# Patient Record
Sex: FEMALE | Race: WHITE
[De-identification: names, ages, dates, MRNs, and addresses within clinical notes are randomized per-mention and may not be internally consistent; named-entity substitution may affect disease eponyms.]

---

## 2019-02-20 ENCOUNTER — HOSPITAL ENCOUNTER (EMERGENCY)
Dept: HOSPITAL 97 - ER | Age: 44
Discharge: HOME | End: 2019-02-20
Payer: COMMERCIAL

## 2019-02-20 DIAGNOSIS — H81.399: Primary | ICD-10-CM

## 2019-02-20 DIAGNOSIS — J11.1: ICD-10-CM

## 2019-02-20 PROCEDURE — 96374 THER/PROPH/DIAG INJ IV PUSH: CPT

## 2019-02-20 PROCEDURE — 99283 EMERGENCY DEPT VISIT LOW MDM: CPT

## 2019-02-20 NOTE — XMS REPORT
Patient Summary Document

 Created on:2019



Patient:JAEL HILL

Sex:Female

:1975

External Reference #:[id number]





Demographics







 Address  [address] [place] [address]

 

 Home Phone  [phone]

 

 Work Phone  [phone] [employer]

 

 Preferred Language  Unknown

 

 Marital Status  Unknown

 

 Evangelical Affiliation  Unknown

 

 Race  Unknown

 

 Additional Race(s)  Unavailable

 

 Ethnic Group  Unknown









Author







 Organization  [facility]

 

 Address  [address] [place] [address] Dr. Martinez [address] [place] [address]

 

 Phone  [phone]









Care Team Providers







 Name  Role  Phone

 

 Unavailable  Unavailable  Unavailable









Problems

This patient has no known problems.



Allergies, Adverse Reactions, Alerts

This patient has no known allergies or adverse reactions.



Medications

This patient has no known medications.

## 2019-02-20 NOTE — EDPHYS
Physician Documentation                                                                           

 [facility]                                                                

Name: Aidee Henning                                                                                 

Age: 43 yrs                                                                                       

Sex: Female                                                                                       

: 1975                                                                                   

MRN: [id number]                                                                                   

Arrival Date: 2019                                                                          

Time: 08:20                                                                                       

Account#: [id number]                                                                            

Bed 16                                                                                            

Private MD: Jinny Marshall                                                                    

ED Physician Danny Montez                                                                      

HPI:                                                                                              

                                                                                             

09:19 This 43 yrs old  Female presents to ER via Ambulatory with complaints of       ps1 

      Dizziness.                                                                                  

09:19 patient has had flu like illness for last 5 days. HA, sinus congestion, cough           ps1 

      (productive), and now c/o dizziness. States that she has not been drinking 2/2 sore         

      throat. Has ear fullness. .                                                                 

                                                                                                  

OB/GYN:                                                                                           

08:45 LMP 2019                                                                            iw  

                                                                                                  

Historical:                                                                                       

- Allergies:                                                                                      

08:47 No Known Allergies;                                                                     iw  

- Home Meds:                                                                                      

08:47 None [Active];                                                                          iw  

- PMHx:                                                                                           

08:47 PUD;                                                                                    iw  

- PSHx:                                                                                           

08:47 Tubal ligation;                                                                         iw  

                                                                                                  

- Immunization history:: Adult Immunizations not up to date.                                      

- Social history:: Smoking status: Patient/guardian denies using tobacco.                         

- Ebola Screening: : Patient negative for fever greater than or equal to 101.5 degrees            

  Fahrenheit, and additional compatible Ebola Virus Disease symptoms Patient denies               

  exposure to infectious person Patient denies travel to an Ebola-affected area in the            

  21 days before illness onset No symptoms or risks identified at this time.                      

                                                                                                  

                                                                                                  

ROS:                                                                                              

09:19 Neck: Negative for injury, pain, and swelling, Cardiovascular: Negative for chest pain, ps1 

      palpitations, and edema, Abdomen/GI: Negative for abdominal pain, nausea, vomiting,         

      diarrhea, and constipation, Back: Negative for injury and pain, MS/Extremity: Negative      

      for injury and deformity, Skin: Negative for injury, rash, and discoloration.               

09:19 Constitutional: Positive for body aches, fatigue, fever, malaise, poor PO intake.           

09:19 ENT: Positive for sinus congestion.                                                         

                                                                                                  

Exam:                                                                                             

09:19 Constitutional:  This is a well developed, well nourished patient who is awake, alert,  ps1 

      and in no acute distress. Head/Face:  Normocephalic, atraumatic. Eyes:  Pupils equal        

      round and reactive to light, extra-ocular motions intact.  Lids and lashes normal.          

      Conjunctiva and sclera are non-icteric and not injected.                                    

09:19 Cardiovascular:  Regular rate and rhythm.  No gallops, murmurs, or rubs.  Normal PMI,       

      no JVD.  No pulse deficits. Respiratory:  Lungs have equal breath sounds bilaterally,       

      clear to auscultation and percussion.  No rales, rhonchi or wheezes noted.  No              

      increased work of breathing, no retractions or nasal flaring. Abdomen/GI:  Soft,            

      non-tender, with normal bowel sounds.  No distension or tympany.  No guarding or            

      rebound.  No evidence of tenderness throughout. MS/ Extremity:  Pulses equal, no            

      cyanosis.  Neurovascular intact.  Full, normal range of motion.                             

09:19 ENT: External ear(s): swelling, that is minimal, bilaterally.                               

09:19 Neuro: Orientation: is normal, Mentation: is normal, HINTS: positive for gaze evoked        

      nystagmus to left. No dysconjugate gaze, no catch up saccade. c/w peripheral vertigo.       

                                                                                                  

Vital Signs:                                                                                      

08:45  / 73; Pulse 90; Resp 16; Temp 97.9(TE); Pulse Ox 98% on R/A; Weight 70.31 kg;    iw  

      Height 5 ft. 6 in. (167.64 cm); Pain 0/10;                                                  

09:37  / 96; Pulse 80; Resp 14; Pulse Ox 100% ;                                         bp  

08:45 Body Mass Index 25.02 (70.31 kg, 167.64 cm)                                             iw  

                                                                                                  

MDM:                                                                                              

09:18 Patient medically screened.                                                             ps1 

                                                                                                  

Administered Medications:                                                                         

09:10 Drug: Meclizine 25 mg Route: PO;                                                        bp  

09:39 Follow up: Response: No adverse reaction; Marked relief of symptoms                     bp  

09:10 Drug: Decadron - Dexamethasone 10 mg {Note: GIVEN PO.} Route: IVP; Site: Other;         bp  

09:38 Follow up: Response: No adverse reaction; Marked relief of symptoms                     bp  

                                                                                                  

                                                                                                  

Disposition:                                                                                      

19 09:29 Discharged to Home. Impression: Other peripheral vertigo, left ear,                

  Influenza-like illness.                                                                         

- Condition is Stable.                                                                            

- Discharge Instructions: Dizziness, Influenza, Adult, Vertigo, Easy-to-Read.                     

- Prescriptions for Meclizine 25 mg Oral Tablet - take 1 tablet by ORAL route every 8             

  hours As needed; 30 tablet. Zofran 8 mg Oral Tablet - take 1 tablet by ORAL route               

  every 12 hours As needed; 20 tablet. chlorpheniramine maleate 4 mg Oral Tablet - take           

  1 tablet by ORAL route every 6 hours As needed; 30 tablet.                                      

- Medication Reconciliation Form, Thank You Letter, Antibiotic Education, Prescription            

  Opioid Use form.                                                                                

- Follow up: Jinny Marshall; When: As needed; Reason: Further diagnostic work-up,             

  Recheck today's complaints, Continuance of care, Re-evaluation by your physician.               

  Follow up: Emergency Department; When: As needed; Reason: Worsening of condition.               

                                                                                                  

                                                                                                  

                                                                                                  

Signatures:                                                                                       

Purnima Menendez RN                     RN   iw                                                   

Martin Parsons RN                      RN   bp                                                   

Danny Montez MD MD   ps1                                                  

                                                                                                  

Corrections: (The following items were deleted from the chart)                                    

09:39 09:29 2019 09:29 Discharged to Home. Impression: Other peripheral vertigo, left   bp  

      ear; Influenza-like illness. Condition is Stable. Forms are Medication Reconciliation       

      Form, Thank You Letter, Antibiotic Education, Prescription Opioid Use. Follow up:           

      Jinny Marshall; When: As needed; Reason: Further diagnostic work-up, Recheck today's     

      complaints, Continuance of care, Re-evaluation by your physician. Follow up: Emergency      

      Department; When: As needed; Reason: Worsening of condition. ps1                            

                                                                                                  

**************************************************************************************************

## 2020-11-15 ENCOUNTER — HOSPITAL ENCOUNTER (EMERGENCY)
Dept: HOSPITAL 97 - ER | Age: 45
Discharge: HOME | End: 2020-11-15
Payer: COMMERCIAL

## 2020-11-15 VITALS — TEMPERATURE: 98.2 F | DIASTOLIC BLOOD PRESSURE: 83 MMHG | SYSTOLIC BLOOD PRESSURE: 125 MMHG | OXYGEN SATURATION: 100 %

## 2020-11-15 DIAGNOSIS — F32.9: ICD-10-CM

## 2020-11-15 DIAGNOSIS — R21: Primary | ICD-10-CM

## 2020-11-15 DIAGNOSIS — K21.9: ICD-10-CM

## 2020-11-15 PROCEDURE — 96372 THER/PROPH/DIAG INJ SC/IM: CPT

## 2020-11-15 PROCEDURE — 99283 EMERGENCY DEPT VISIT LOW MDM: CPT

## 2020-11-15 NOTE — ER
Nurse's Notes                                                                                     

 [facility]                                                                 

Name: Aidee Henning                                                                                 

Age: 45 yrs                                                                                       

Sex: Female                                                                                       

: 1975                                                                                   

MRN: [id number]                                                                                   

Arrival Date: 11/15/2020                                                                          

Time: 10:30                                                                                       

Account#: [id number]                                                                            

Bed 17                                                                                            

Private MD:                                                                                       

Diagnosis: Rash and other nonspecific skin eruption                                               

                                                                                                  

Presentation:                                                                                     

11/15                                                                                             

10:40 Chief complaint: Patient states: Rash all over x 1 week, c/o itchiness. Went to PCP,    aa5 

      had steroid and allergy shot, it quit itching for a day, then last night it came back       

      and rashes broke out worse. Used Benadryl spray and cortisone cream, but only temporary     

      relief. Coronavirus screen: Client denies travel out of the [place] in the last 14 days.       

      At this time, the client does not indicate any symptoms associated with coronavirus-19.     

      Ebola Screen: Patient negative for fever greater than or equal to 101.5 degrees             

      Fahrenheit, and additional compatible Ebola Virus Disease symptoms Patient denies           

      exposure to infectious person. Patient denies travel to an Ebola-affected area in the       

      21 days before illness onset. No symptoms or risks identified at this time. Initial         

      Sepsis Screen: Does the patient meet any 2 criteria? No. Patient's initial sepsis           

      screen is negative. Does the patient have a suspected source of infection? No.              

      Patient's initial sepsis screen is negative. Risk Assessment: Do you want to hurt           

      yourself or someone else? Patient reports no desire to harm self or others. Onset of        

      symptoms was November 15, 2020.                                                             

10:40 Method Of Arrival: Ambulatory                                                           aa5 

10:40 Acuity: BALTAZAR 4                                                                           aa5 

                                                                                                  

OB/GYN:                                                                                           

10:44 LMP N/A - Irregular menses                                                              aa5 

                                                                                                  

Historical:                                                                                       

- Allergies:                                                                                      

10:44 No Known Allergies;                                                                     aa5 

- Home Meds:                                                                                      

10:44 Zoloft Oral [Active]; pantoprazole oral oral [Active];                                  aa5 

- PMHx:                                                                                           

10:44 PUD; GERD; Depression;                                                                  aa5 

- PSHx:                                                                                           

10:44 Tubal ligation;                                                                         aa5 

                                                                                                  

- Immunization history:: Adult Immunizations up to date, Flu vaccine is up to date.               

- Social history:: Smoking status: Patient denies any tobacco usage or history of.                

- Family history:: not pertinent.                                                                 

- Hospitalizations: : No recent hospitalization is reported.                                      

                                                                                                  

                                                                                                  

Screenin:00 Abuse screen: Denies threats or abuse. Denies injuries from another. Nutritional        sv  

      screening: No deficits noted. Tuberculosis screening: No symptoms or risk factors           

      identified. Fall Risk None identified.                                                      

                                                                                                  

Assessment:                                                                                       

11:00 General: Appears in no apparent distress. comfortable, well groomed, well developed,    sv  

      Behavior is calm, cooperative, appropriate for age. Pain: Denies pain. Neuro: Level of      

      Consciousness is awake, alert, obeys commands, Oriented to person, place, time,             

      situation, Moves all extremities. Full function Gait is steady. Respiratory: Airway is      

      patent Respiratory effort is even, unlabored, Respiratory pattern is regular,               

      symmetrical. Derm: Skin is pink, warm \T\ dry. Rash noted that is itchy, red, urticaria,    

      on chest, abdomen, right arm, left arm, right leg and left leg. Musculoskeletal: Range      

      of motion: intact in all extremities.                                                       

11:09 Reassessment: Socks given to the pt per her request.                                    sv  

11:45 Reassessment: Pt waiting IM shot time before discharge.                                 sv  

12:05 Reassessment: Patient appears in no apparent distress at this time. No changes from     sv  

      previously documented assessment. Patient and/or family updated on plan of care and         

      expected duration. Pain level reassessed. Patient is alert, oriented x 3, equal             

      unlabored respirations, skin warm/dry/pink.                                                 

                                                                                                  

Vital Signs:                                                                                      

10:40  / 83; Pulse 80; Resp 16 S; Temp 98.2(O); Pulse Ox 100% on R/A; Weight 74.84 kg   aa5 

      (R); Height 5 ft. 6 in. (167.64 cm) (R);                                                    

10:40 Body Mass Index 26.63 (74.84 kg, 167.64 cm)                                             aa5 

                                                                                                  

ED Course:                                                                                        

10:30 Patient arrived in ED.                                                                  ag5 

10:43 Triage completed.                                                                       aa5 

10:44 Arm band placed on right wrist.                                                         aa5 

11:00 Patient has correct armband on for positive identification. Placed in gown. Bed in low  sv  

      position. Call light in reach. Door closed. Warm blanket given. Head of bed elevated.       

11:05 Chetan Rodrigues MD is Attending Physician.                                                rn  

11:09 Jinny Danielson RN is Primary Nurse.                                                  sv  

11:09 ED physician to see patient.                                                            sv  

12:05 No provider procedures requiring assistance completed. Patient did not have IV access   sv  

      during this emergency room visit.                                                           

                                                                                                  

Administered Medications:                                                                         

11:44 Drug: SOLU-Medrol 125 mg Route: IM; Site: left gluteus;                                 sv  

12:04 Follow up: Response: No adverse reaction                                                sv  

                                                                                                  

                                                                                                  

Outcome:                                                                                          

: Discharge ordered by MD.                                                                rn  

12:05 Discharged to home ambulatory.                                                          sv  

12:05 Condition: stable                                                                           

12:05 Discharge instructions given to patient, Instructed on discharge instructions, follow       

      up and referral plans. medication usage, Demonstrated understanding of instructions,        

      follow-up care, medications, Prescriptions given X 4.                                       

12:05 Patient left the ED.                                                                    sv  

                                                                                                  

Signatures:                                                                                       

Jinny Danielson, RN                    RN   Chetan Sebastian MD MD rn Calderon, Audri, RN                     RN   aa5                                                  

Luis Gr                                                  

                                                                                                  

**************************************************************************************************

## 2020-11-15 NOTE — XMS REPORT
Summarization of Episode Note

                           Created on:November 3, 2020



Patient:Aidee Henning

Sex:Female

:1975

External Reference #:[id number]





Demographics







                          Address                   [address]



                                                    [place] [address]

 

                          Home Phone                [phone]

 

                          Mobile Phone              [phone]

 

                          Email Address             [email or web address]

 

                          Preferred Language        en

 

                          Marital Status            Unknown

 

                          Zoroastrian Affiliation     Unknown

 

                          Race                      White

 

                          Ethnic Group              Not  or 









Author







                          Organization              [facility]

 

                          Address                   [address] 



                                                    [place] [address]

 

                          Phone                     [phone]









Support







                Name            Relationship    Address         Phone

 

                Milady          Unavailable     [id number]     [phone]



                                                [place] [address] 

 

                Milady          Unavailable     [address]     [phone]



                                                [place] [address] 









Care Team Providers







                    Name                Role                Phone

 

                    Mitul Johnson         [phone]









PROBLEMS







        Type    Condition ICD9-CM QLI66-UH Onset   Condition SNOMED Code Notes



                        Code    Code    Dates   Status          

 

        Problem Acute           F32.9           Active  697359564 



                depression                                         

 

        Problem History of         Z98.82          Active          



                breast implant                                         

 

        Problem Migraine         G43.909         Active  16348322 



                without status                                         



                migrainosus,                                         



                not                                             



                intractable,                                         



                unspecified                                         



                migraine type                                         

 

        Problem GERD without         K21.9           Active  014693609 



                esophagitis                                         

 

        Problem Nausea alone         R11.0           Active  257450660 







ALLERGIES

No Known Allergies



ENCOUNTERS from 1975 to 2020







             Encounter    Location     Date         Provider     Diagnosis

 

             [facility] [address] [facility] [address] 27 Oct, 2020 Brad Bauman     

Gastroenteritis 

K52.9



             Family [place], [place]                           



                          [address]                             







IMMUNIZATIONS

No Information



SOCIAL HISTORY

Tobacco Use:





                    Social History Observation Description         Date

 

                    Details (start date - stop date) Former Smoker       



Sex Assigned At Birth:





                          Social History Observation Description

 

                          Sex Assigned At Birth     Unknown



PHQ9





                    Question            Answer              Notes

 

                    Little interest or pleasure in doing things Several days    

    

 

                    Feeling down, depressed, or hopeless Nearly every day    

 

                    Trouble falling or staying asleep or sleeping too much Not a

t all          

 

                    Feeling tired or having little energy More than half the day

s 

 

                    Poor appetite or overeating Nearly every day    

 

                    Feeling bad about yourself, or that you are a failure, More 

than half the days 



                    or have let yourself or your family down                    

 

 

                    Trouble concentrating on things, such as reading the Several

 days        



                    newspaper or watching television                     

 

                    Moving or speaking so slowly that other people could Not at 

all          



                    have noticed; or the opposite, being so fidgety or          

           



                    restless that you have been moving around a lot more        

             



                    than usual                              

 

                    Total Score         12                  

 

                    Interpretation      Moderate Depression 

 

                    Thoughts that you would be better off dead or of Not at all 

         



                    hurting yourself in some way                     



Alcohol Screen





                    Question            Answer              Notes

 

                    Did you have a drink containing alcohol in the past Yes     

            



                    year?                                   

 

                    Points              1                   

 

                    Interpretation      Negative            

 

                    How often did you have a drink containing alcohol in Monthly

 or less (1 point) 



                    the past year?                          



Tobacco Use/Smoking





                    Question            Answer              Notes

 

                    Are you a           former smoker       







REASON FOR REFERRAL

No Information



VITAL SIGNS

No information



MEDICATIONS







             Medication   SIG (Take, Route, Frequency, Start Date   End Date    

 Status



                          Duration)                              

 

             Sertraline HCl 25 MG 1 tablet Orally Once a day for                

           Active



                          30                                     

 

             Ubrelvy 100 MG 1 tablet may take second dose 16 Oct, 2020 15  Active



                          at least 2 hours after first                          

 



                          dose as needed Orally Once a                          

 



                          day for 30 day(s)                           

 

             Dicyclomine HCl 20 MG 1 tablet Orally Four times a 27 Oct, 2020 5 2020  

Active



                          day as needed for cramping for                        

   



                          10 days                                

 

             Lansoprazole 30 MG take 1 capsule by mouth once                    

       Active



                          daily for 30 days Oral Once a                         

  



                          day for 90 days                           







PROCEDURES

No Information



RESULTS

No Results



REASON FOR VISIT

Sick/Door pt



MEDICAL (GENERAL) HISTORY







                    Type                Description         Date

 

                    Medical History     GERD without esophagitis 

 

                    Medical History     Migraine without status migrainosus, not

 intractable, 



                                        unspecified migraine type 

 

                    Medical History     Fibrocystic  Breast 

 

                    Medical History     Well woman exam with routine gynecologic

al exam 

 

                    Medical History     Acute vaginitis     

 

                    Surgical History    Breast  Implants  Saline  type. 1985







Goals Section

No Information



Health Concerns

No Information



MEDICAL EQUIPMENT

No Information



MENTAL STATUS

No Information



FUNCTIONAL STATUS

No Information



ASSESSMENTS







                    Encounter Date      Diagnosis           Notes

 

                    27 Oct, 2020        Gastroenteritis (ICD-10 - K52.9) 







PLAN OF TREATMENT

Medication





                Medication Name Sig             Start Date      Stop Date

 

                Sertraline HCl 25 MG 1 tablet Orally Once a day for 30          

       

 

                Dicyclomine HCl 20 MG 1 tablet Orally Four times a day as 27 Oct

, 2020    5 2020



                                needed for cramping for 10 days                 



Treatment Notes





                    Assessment          Notes               Clinical Notes

 

                    Gastroenteritis     44 F presents with abd cramping and wate

ry diarrhea x 



                                        4 days without f/c, tollerating fluids s

olids.-will 



                                        start dicyclomine-continue oral fluids a

nd solids-RTC 



                                        precautions discussed

## 2020-11-15 NOTE — XMS REPORT
Summarization of Episode Note

                           Created on:2020



Patient:Aidee Henning

Sex:Female

:1975

External Reference #:[id number]





Demographics







                          Address                   [address] 



                                                    [place] [address]

 

                          Home Phone                [phone]

 

                          Mobile Phone              [phone]

 

                          Email Address             [email or web address]

 

                          Preferred Language        en

 

                          Marital Status            Unknown

 

                          Sikh Affiliation     Unknown

 

                          Race                      White

 

                          Ethnic Group              Not  or 









Author







                          Organization              [facility]

 

                          Address                   [address] [facility]



                                                    [place] [address]

 

                          Phone                     [phone]









Support







                Name            Relationship    Address         Phone

 

                Milady          Unavailable     [address]     [phone]



                                                [place] [address] 

 

                Milady          Unavailable     [address]  [address]    [phone]



                                                [place] [address] 









Care Team Providers







                    Name                Role                Phone

 

                    Biju Johnson         [phone]









PROBLEMS







        Type    Condition ICD9-CM NNU95-EI Onset   Condition SNOMED Code Notes



                        Code    Code    Dates   Status          

 

        Problem Acute           F32.9           Active  231500957 



                depression                                         

 

        Problem History of         Z98.82          Active          



                breast implant                                         

 

        Problem Migraine         G43.909         Active  35913640 



                without status                                         



                migrainosus,                                         



                not                                             



                intractable,                                         



                unspecified                                         



                migraine type                                         

 

        Problem GERD without         K21.9           Active  444301479 



                esophagitis                                         

 

        Problem Nausea alone         R11.0           Active  155300500 







ALLERGIES

No Known Allergies



ENCOUNTERS from 1974 to 2020-10-08







             Encounter    Location     Date         Provider     Diagnosis

 

             [facility] [address] [facility] 08 Oct, 2020 Priya Ivy  Migr

palomo without



             Family Medicine [address],                           status 

migrainosus,



                          [place] [address]                           not intractabl

e,



                                                                 unspecified lesly

michelle



                                                                 type G43.909 an

d



                                                                 Nausea alone R1

1.0







IMMUNIZATIONS

No Information



SOCIAL HISTORY

Tobacco Use:





                    Social History Observation Description         Date

 

                    Details (start date - stop date) Former Smoker       



Sex Assigned At Birth:





                          Social History Observation Description

 

                          Sex Assigned At Birth     Unknown



PHQ9





                    Question            Answer              Notes

 

                    Little interest or pleasure in doing things Several days    

    

 

                    Feeling down, depressed, or hopeless Nearly every day    

 

                    Trouble falling or staying asleep or sleeping too much Not a

t all          

 

                    Feeling tired or having little energy More than half the day

s 

 

                    Poor appetite or overeating Nearly every day    

 

                    Feeling bad about yourself, or that you are a failure, More 

than half the days 



                    or have let yourself or your family down                    

 

 

                    Trouble concentrating on things, such as reading the Several

 days        



                    newspaper or watching television                     

 

                    Moving or speaking so slowly that other people could Not at 

all          



                    have noticed; or the opposite, being so fidgety or          

           



                    restless that you have been moving around a lot more        

             



                    than usual                              

 

                    Total Score         12                  

 

                    Interpretation      Moderate Depression 

 

                    Thoughts that you would be better off dead or of Not at all 

         



                    hurting yourself in some way                     



Alcohol Screen





                    Question            Answer              Notes

 

                    Did you have a drink containing alcohol in the past Yes     

            



                    year?                                   

 

                    Points              1                   

 

                    Interpretation      Negative            

 

                    How often did you have a drink containing alcohol in Monthly

 or less (1 point) 



                    the past year?                          



Tobacco Use/Smoking





                    Question            Answer              Notes

 

                    Are you a           former smoker       







REASON FOR REFERRAL

No Information



VITAL SIGNS







                    Height              65 in               08 Oct, 2020

 

                    Weight              165.4 lbs           08 Oct, 2020

 

                    Temperature         97.4 degrees Fahrenheit 08 Oct, 2020

 

                    BMI                 27.52 kg/m2         08 Oct, 2020

 

                    Oximetry            97 %                08 Oct, 2020

 

                    Respiratory Rate    16 /min             08 Oct, 2020

 

                    Blood pressure systolic 128 mm Hg           08 Oct, 2020

 

                    Blood pressure diastolic 64 mm Hg            08 Oct, 2020







MEDICATIONS







             Medication   SIG (Take, Route, Frequency, Start Date   End Date    

 Status



                          Duration)                              

 

             Lansoprazole 30 MG take 1 capsule by mouth once                    

       Active



                          daily for 30 days Oral Once a                         

  



                          day for 90 days                           

 

             Sertraline HCl 25 MG 1 tablet Orally Once a day 28 Sep, 2020       

       Active



                          for 30 day(s)                           

 

             Promethazine HCl 25 mg 1 tablet as needed Orally              2020 Active



                          every 12 hrs for 30 days                           







PROCEDURES

No Information



RESULTS

No Results



REASON FOR VISIT

migranes; starting to have more often, forgot to mention at last visit 
[phone]



MEDICAL (GENERAL) HISTORY







                    Type                Description         Date

 

                    Medical History     GERD without esophagitis 

 

                    Medical History     Migraine without status migrainosus, not

 intractable, 



                                        unspecified migraine type 

 

                    Medical History     Fibrocystic  Breast 

 

                    Medical History     Well woman exam with routine gynecologic

al exam 

 

                    Medical History     Acute vaginitis     

 

                    Surgical History    Breast  Implants  Saline  type. 1985







Goals Section

No Information



Health Concerns

No Information



MEDICAL EQUIPMENT

No Information



MENTAL STATUS

No Information



FUNCTIONAL STATUS

No Information



ASSESSMENTS







                    Encounter Date      Diagnosis           Notes

 

                    08 Oct, 2020        Nausea alone (ICD-10 - R11.0) 

 

                    08 Oct, 2020        Migraine without status migrainosus, not

 intractable, 



                                        unspecified migraine type (ICD-10 - G43.

909) 







PLAN OF TREATMENT

Treatment Notes





                    Assessment          Notes               Clinical Notes

 

                    Migraine without status migrainosus, Samples:Ubrelvy starter

 pack Lot# 



                    not intractable, unspecified [id number] X 1Ubrelvy 50 mg tab Lo

t# 



                    migraine type       [id number] exp 11/2022 X 1 boxUbrelvy 



                                        100mg Tab Lot# [id number] exp 1/2022 



                                        X 1 boxTake med at the beginning 



                                        of the HA.          

 

                    Nausea alone        clear liquids       



Next Appt





                                        Details

 

                                        keep appt in 3 weeks Reason:

 

                                        Provider Name:Priya Ivy, 2020-10-19 04

:00:00 PM, [address] [facility], [address], [place], [address], [phone]







Insurance Providers







          Payer Name Payer     Payer     Insured   Patient   Coverage  Coverage 

End



                    Address   Phone     Name      Relationship to Start Date [place]

e



                                                  Insured             

 

          [employer] from [address]    [phone] Aidee Henning self                



          [place]  [id number]    [phone]       [facility]                                         



                    [id number]

## 2020-11-15 NOTE — XMS REPORT
Summarization of Episode Note

                           Created on:2020



Patient:Aidee Henning

Sex:Female

:1975

External Reference #:[id number]





Demographics







                          Address                   [address]



                                                    [place] [address]

 

                          Home Phone                [phone]

 

                          Mobile Phone              [phone]

 

                          Email Address             [email or web address]

 

                          Preferred Language        en

 

                          Marital Status            Unknown

 

                          Mosque Affiliation     Unknown

 

                          Race                      White

 

                          Ethnic Group              Not  or 









Author







                          Organization              [facility]

 

                          Address                   [address]



                                                    [place] [address]

 

                          Phone                     [phone]









Support







                Name            Relationship    Address         Phone

 

                Milady          Unavailable     [address]  [address]    [phone]



                                                [place] [address] 

 

                Milady          Unavailable     [id number]     [phone]



                                                [place] [address] 









Care Team Providers







                    Name                Role                Phone

 

                    Biju Johnson         [phone]









PROBLEMS







        Type    Condition ICD9-CM FMB00-AZ Onset   Condition SNOMED Code Notes



                        Code    Code    Dates   Status          

 

        Problem Acute           F32.9           Active  260883235 



                depression                                         

 

        Problem History of         Z98.82          Active          



                breast implant                                         

 

        Problem Migraine         G43.909         Active  90256152 



                without status                                         



                migrainosus,                                         



                not                                             



                intractable,                                         



                unspecified                                         



                migraine type                                         

 

        Problem GERD without         K21.9           Active  910664911 



                esophagitis                                         

 

        Problem Nausea alone         R11.0           Active  121885413 







ALLERGIES

No Known Allergies



ENCOUNTERS from 1975 to 2020-10-16







             Encounter    Location     Date         Provider     Diagnosis

 

             [facility] [address] [address] Oct, 2020 Priya vincent  



             [place] [address]                           







IMMUNIZATIONS

No Information



SOCIAL HISTORY

Tobacco Use:





                    Social History Observation Description         Date

 

                    Details (start date - stop date) Former Smoker       



Sex Assigned At Birth:





                          Social History Observation Description

 

                          Sex Assigned At Birth     Unknown



PHQ9





                    Question            Answer              Notes

 

                    Little interest or pleasure in doing things Several days    

    

 

                    Feeling down, depressed, or hopeless Nearly every day    

 

                    Trouble falling or staying asleep or sleeping too much Not a

t all          

 

                    Feeling tired or having little energy More than half the day

s 

 

                    Poor appetite or overeating Nearly every day    

 

                    Feeling bad about yourself, or that you are a failure, More 

than half the days 



                    or have let yourself or your family down                    

 

 

                    Trouble concentrating on things, such as reading the Several

 days        



                    newspaper or watching television                     

 

                    Moving or speaking so slowly that other people could Not at 

all          



                    have noticed; or the opposite, being so fidgety or          

           



                    restless that you have been moving around a lot more        

             



                    than usual                              

 

                    Total Score         12                  

 

                    Interpretation      Moderate Depression 

 

                    Thoughts that you would be better off dead or of Not at all 

         



                    hurting yourself in some way                     



Alcohol Screen





                    Question            Answer              Notes

 

                    Did you have a drink containing alcohol in the past Yes     

            



                    year?                                   

 

                    Points              1                   

 

                    Interpretation      Negative            

 

                    How often did you have a drink containing alcohol in Monthly

 or less (1 point) 



                    the past year?                          



Tobacco Use/Smoking





                    Question            Answer              Notes

 

                    Are you a           former smoker       







REASON FOR REFERRAL

No Information



VITAL SIGNS

No information



MEDICATIONS







             Medication   SIG (Take, Route, Frequency, Start Date   End Date    

 Status



                          Duration)                              

 

             Lansoprazole 30 MG take 1 capsule by mouth once                    

       Active



                          daily for 30 days Oral Once a                         

  



                          day for 90 days                           

 

             Sertraline HCl 25 MG 1 tablet Orally Once a day 28 Sep, 2020       

       Active



                          for 30 day(s)                           

 

             Promethazine HCl 25 mg 1 tablet as needed Orally              2020 Active



                          every 12 hrs for 30 days                           

 

             Ubrelvy 100 MG 1 tablet may take second dose 16 Oct, 2020 15 Nov, 

020 Active



                          at least 2 hours after first                          

 



                          dose as needed Orally Once a                          

 



                          day for 30 day(s)                           







PROCEDURES

No Information



RESULTS

No Results



REASON FOR VISIT

Ubrelvy



MEDICAL (GENERAL) HISTORY







                    Type                Description         Date

 

                    Medical History     GERD without esophagitis 

 

                    Medical History     Migraine without status migrainosus, not

 intractable, 



                                        unspecified migraine type 

 

                    Medical History     Fibrocystic  Breast 

 

                    Medical History     Well woman exam with routine gynecologic

al exam 

 

                    Medical History     Acute vaginitis     

 

                    Surgical History    Breast  Implants  Saline  type. 1985







Goals Section

No Information



Health Concerns

No Information



MEDICAL EQUIPMENT

No Information



MENTAL STATUS

No Information



FUNCTIONAL STATUS

No Information



ASSESSMENTS

No Information



PLAN OF TREATMENT

Medication





                Medication Name Sig             Start Date      Stop Date

 

                Ubrelvy 100 MG  1 tablet may take second dose at least 2 16 Oct,

 2020    15 Nov, 

2020



                                hours after first dose as needed Orally         

        



                                Once a day for 30 day(s)                 



Next Appt





                                        Details

 

                                        Provider Name:Priya Ivy, 2020-10-28 02

:20:00 PM, [address], [address], [place], [address], [phone]







Insurance Providers







          Payer Name Payer     Payer     Insured   Patient   Coverage  Coverage 

End



                    Address   Phone     Name      Relationship to Start Date Angelito

e



                                                  Insured             

 

          [employer] from [address]    [phone] Aidee Henning self                



          [place]  [phone]       [facility]                                         



                    [address]

## 2020-11-15 NOTE — XMS REPORT
Continuity of Care Document

                          Created on:November 15, 2020



Patient:JAEL HILL

Sex:Female

:1975

External Reference #:[id number]





Demographics







                          Address                   [address] [place] [address] [address]



                                                    [place] [id number]

 

                          Home Phone                [phone]

 

                          Work Phone                [phone] [facility]

 

                          Mobile Phone              [phone]

 

                          Email Address             [email or web address]

 

                          Preferred Language        en

 

                          Marital Status            Unknown

 

                          Tenriism Affiliation     Unknown

 

                          Race                      Unknown

 

                          Additional Race(s)        White



                                                    Unavailable

 

                          Ethnic Group              Not  or 









Author







                          Organization              [facility]

t

 

                          Address                   [address]



                                                    [place] [address]

 

                          Phone                     [phone]









Support







                Name            Relationship    Address         Phone

 

                Milady          Unavailable     [address] [address]    [phone]



                                                [place] [id number] 

 

                Milady          Unavailable     [address] [address]    [phone]



                                                [place] [address] 









Care Team Providers







                    Name                Role                Phone

 

                    Unavailable         Unavailable         Unavailable









Problems

This patient has no known problems.



Allergies, Adverse Reactions, Alerts

This patient has no known allergies or adverse reactions.



Medications







       Ordered Filled Start  Stop   Current Ordering Indication Dosage Frequency

 Signature

                    Comments            Components          Source



     Medication Medication Date Date Medication? Clinician                (SIG) 

          



     Name Name                                                   

 

     Lexapro Lexapro       Yes  Valerie                1 tablet           C

[facility]



                          [facility]                               [facility]



               [facility]                                              [facility]

 

     Prevacid Prevacid           Yes  Valerie                1 capsule           

[facility]



                              [facility]                               [facility]



                                                                 [facility]

 

     Promethazin Promethazin           Yes  Valerie                1 tablet      

     [facility]



     e HCl e HCl                Meehan                as needed           [facility]



                                                  [facility]  N[facility]V           [facility]







Procedures

This patient has no known procedures.



Encounters







        Start   End     Encounter Admission Attending Care    Care    Encounter 

Source



        Date/Time Date/Time Type    Type    Clinicians Facility Department ID   

   

 

        2020-11-10 2020-11-10 Outpatient                 [facility]  [id number]

 [facility]



        [facility]00                                                 [facility]



                                                                        [facility]



                                                                        l



                                                                        Outpati



                                                                        ent



                                                                        Clinics

 

        2020-10-27 2020-10-27 Outpatient                 [facility]  [id number]

 [facility]



        [facility]                                                 [facility]

 

        2020-10-27 2020-10-27 Outpatient                 [facility]  [id number]

 [facility]



        [facility]00                                                 [facility]



                                                                        [facility]



                                                                        l



                                                                        Outpati



                                                                        ent



                                                                        Clinics

 

        2020[facility]10[facility]16 2020[facility]16 [facility]  [id number]



        [facility]00                                                 [facility]



                                                                        [facility]



                                                                        l



                                                                        Outpati



                                                                        ent



                                                                        Clinics

 

        2020-10-08 2020[facility]-08 [facility]  [id number]

 [facility]



        [facility]00                                                 [facility]



                                                                        [facility]



                                                                        l



                                                                        Outpati



                                                                        ent



                                                                        Clinics

 

        2020 [facility]  [id number]

 [facility]



        [facility]00                                                 [facility]



                                                                        [facility]



                                                                        l



                                                                        Outpati



                                                                        ent



                                                                        Clinics

 

        2020-05-15 2020-05-15 Outpatient                 [facility]

[id number] [facility]



        14:46:00 14:46:00                         [facility]  Medicine         l



                                                Medicine                 Outpati



                                                                        ent



                                                                        Clinics

 

        2020 Outpatient                 [facility]

[id number] [facility]



        13:43:00 13:43:00                         [facility]  Medicine         l



                                                Medicine                 Outpati



                                                                        ent



                                                                        Clinics

 

        2020 Outpatient                 [facility]

[id number] [facility]



        16:38:00 16:38:00                         [facility]  Medicine         l



                                                Medicine                 Outpati



                                                                        ent



                                                                        Clinics

 

        2020 Outpatient                 [facility]

[id number] [facility]



        09:17:00 09:17:00                         [facility]  Medicine         l



                                                Medicine                 Outpati



                                                                        ent



                                                                        Clinics

 

        2020 Outpatient                 [facility]

[id number] [facility]



        08:00:00 08:00:00                         [facility]  Medicine         l



                                                Medicine                 Outpati



                                                                        ent



                                                                        Clinics

 

        2020 Outpatient                 [facility]

[id number] [facility]



        10:25:00 10:25:00                         [facility]  Medicine         l



                                                Medicine                 Outpati



                                                                        ent



                                                                        Clinics

 

        2020 Outpatient                 [facility]

[id number] [facility]



        08:27:00 08:27:00                         [facility]  Medicine         l



                                                Medicine                 Outpati



                                                                        ent



                                                                        Clinics

 

        2020 Outpatient                 [facility]

[id number] [facility]



        09:40:00 09:40:00                         [facility]  Medicine         l



                                                Medicine                 Outpati



                                                                        ent



                                                                        Clinics

 

        2020 Outpatient                 [facility]

[id number] [facility]



        15:20:00 15:20:00                         [facility]



                                                Medicine                 Outpati



                                                                        ent



                                                                        Clinics

 

        2020 Outpatient                 [facility] [address]

[id number] [facility]



        14:03:00 14:03:00                         [facility]  Medicine         l



                                                Medicine                 Outpati



                                                                        ent



                                                                        Clinics

 

        2020 Outpatient                 [facility] [address]

[id number] [facility]



        09:00:00 09:00:00                         [facility]



                                                [facility]



                                                                        [facility]



                                                                        [facility]







Results

This patient has no known results.

## 2020-11-15 NOTE — EDPHYS
Physician Documentation                                                                           

 [facility]                                                                 

Name: Aidee Henning                                                                                 

Age: 45 yrs                                                                                       

Sex: Female                                                                                       

: 1975                                                                                   

MRN: [id number]                                                                                   

Arrival Date: 11/15/2020                                                                          

Time: 10:30                                                                                       

Account#: [id number]                                                                            

Bed 17                                                                                            

Private MD:                                                                                       

ED Physician Chetan Rodrigues                                                                         

HPI:                                                                                              

11/15                                                                                             

11:17 This 45 yrs old  Female presents to ER via Ambulatory with complaints of Rash. rn  

11:17 The patient's rash thought to be caused by an unknown cause. The rash is located on the rn  

      body diffusely. The rash can be described as diffuse, erythematous, papular.                

11:18 Onset: The symptoms/episode began/occurred 1 week(s) ago. Associated signs and          rn  

      symptoms: Pertinent negatives: difficulty breathing, fever, swelling of lips, swelling      

      of throat, swelling of tongue. Severity of symptoms: At their worst the symptoms were       

      mild in the emergency department the symptoms are unchanged. The patient has not            

      experienced similar symptoms in the past. The patient has been recently seen by a           

      physician:. Reports diffuse itching and rash to body, started on right arm and spread       

      to torso. No fever. Works in healthcare. No drainage. Given steroid injection by pcp        

      and improved itching, rash itself did not improve. No other person in household showing     

      similar symptoms. .                                                                         

                                                                                                  

OB/GYN:                                                                                           

10:44 LMP N/A - Irregular menses                                                              aa5 

                                                                                                  

Historical:                                                                                       

- Allergies:                                                                                      

10:44 No Known Allergies;                                                                     aa5 

- Home Meds:                                                                                      

10:44 Zoloft Oral [Active]; pantoprazole oral oral [Active];                                  aa5 

- PMHx:                                                                                           

10:44 PUD; GERD; Depression;                                                                  aa5 

- PSHx:                                                                                           

10:44 Tubal ligation;                                                                         aa5 

                                                                                                  

- Immunization history:: Adult Immunizations up to date, Flu vaccine is up to date.               

- Social history:: Smoking status: Patient denies any tobacco usage or history of.                

- Family history:: not pertinent.                                                                 

- Hospitalizations: : No recent hospitalization is reported.                                      

                                                                                                  

                                                                                                  

ROS:                                                                                              

11:18 Constitutional: Negative for fever, chills, and weight loss, Eyes: Negative for injury, rn  

      pain, redness, and discharge, ENT: Negative for injury, pain, and discharge, Neck:          

      Negative for injury, pain, and swelling, Cardiovascular: Negative for chest pain,           

      palpitations, and edema, Respiratory: Negative for shortness of breath, cough,              

      wheezing, and pleuritic chest pain, Abdomen/GI: Negative for abdominal pain, nausea,        

      vomiting, diarrhea, and constipation, MS/Extremity: Negative for injury and deformity,      

      Skin: + rash diffusely Neuro: Negative for headache, weakness, numbness, tingling, and      

      seizure.                                                                                    

                                                                                                  

Exam:                                                                                             

11:18 Constitutional:  This is a well developed, well nourished patient who is awake, alert,  rn  

      and in no acute distress. Head/Face:  Normocephalic, atraumatic. Respiratory:  No           

      wheezing or respiratory distress Skin:  warm, dry, + diffuse erythematous papular rash      

      over extremities/torso/neck. No fluctuance. + numerous excoriations.                        

                                                                                                  

Vital Signs:                                                                                      

10:40  / 83; Pulse 80; Resp 16 S; Temp 98.2(O); Pulse Ox 100% on R/A; Weight 74.84 kg   aa5 

      (R); Height 5 ft. 6 in. (167.64 cm) (R);                                                    

10:40 Body Mass Index 26.63 (74.84 kg, 167.64 cm)                                             aa5 

                                                                                                  

MDM:                                                                                              

11:05 Patient medically screened.                                                             rn  

11:18 Differential diagnosis: allergic reaction, parasite infection, folliculitis. Data       rn  

      reviewed: vital signs, nurses notes, and as a result, I will discharge patient.             

      Counseling: I had a detailed discussion with the patient and/or guardian regarding: the     

      historical points, exam findings, and any diagnostic results supporting the                 

      discharge/admit diagnosis, the need for outpatient follow up, to return to the              

      emergency department if symptoms worsen or persist or if there are any questions or         

      concerns that arise at home. Special discussion: I discussed with the patient/guardian      

      in detail that at this point there is no indication for admission to the hospital. It       

      is understood, however, that if the symptoms persist or worsen the patient needs to         

      return immediately for re-evaluation. Based on the history and exam findings, there is      

      no indication for further emergent testing or inpatient evaluation. I discussed with        

      the patient/guardian the need to see the dermatologist for further evaluation of the        

      symptoms. ED course: Possible scabies/dermatitis/folliculitis. Unable to test here,         

      will dc home with steroids/abx/permethrin, and recommend dermatology f/u if worsens. .      

                                                                                                  

Administered Medications:                                                                         

11:44 Drug: SOLU-Medrol 125 mg Route: IM; Site: left gluteus;                                 sv  

12:04 Follow up: Response: No adverse reaction                                                sv  

                                                                                                  

                                                                                                  

Disposition:                                                                                      

11/15/20 11:22 Discharged to Home. Impression: Rash and other nonspecific skin eruption.          

- Condition is Stable.                                                                            

- Discharge Instructions: Rash.                                                                   

- Prescriptions for permethrin 5 % Topical cream - apply 1 application by TOPICAL route           

  once wkly for 2 weeks leave on for 8-14 hr, then remove by thorough washing; 2 tube.            

  Hydroxyzine HCl 50 mg Oral Tablet - take 1 tablet by ORAL route every 8 hours As                

  needed; 20 tablet. Prednisone 20 mg Oral Tablet - take 3 tablet by ORAL route once              

  daily for 5 days; 15 tablet. Bactrim - 160 mg Oral Tablet - take 1 tablet by              

  ORAL route every 12 hours for 10 days; 20 tablet.                                               

- Medication Reconciliation Form, Thank You Letter, Antibiotic Education, Prescription            

  Opioid Use form.                                                                                

- Follow up: Private Physician; When: As needed; Reason: Recheck today's complaints,              

  Re-evaluation by your physician.                                                                

- Problem is an ongoing problem.                                                                  

- Symptoms are unchanged.                                                                         

                                                                                                  

                                                                                                  

                                                                                                  

Signatures:                                                                                       

Jinny Danielson RN RN sv Nieto, Roman, MD MD rn Calderon, Audri, RN                     RN   aa5                                                  

                                                                                                  

Corrections: (The following items were deleted from the chart)                                    

12:05 11:22 11/15/2020 11:22 Discharged to Home. Impression: Rash and other nonspecific skin  sv  

      eruption. Condition is Stable. Forms are Medication Reconciliation Form, Thank You          

      Letter, Antibiotic Education, Prescription Opioid Use. Follow up: Private Physician;        

      When: As needed; Reason: Recheck today's complaints, Re-evaluation by your physician.       

      Problem is an ongoing problem. Symptoms are unchanged. rn                                   

                                                                                                  

**************************************************************************************************

## 2020-11-15 NOTE — XMS REPORT
Summarization of Episode Note

                           Created on:2020



Patient:Aidee Henning

Sex:Female

:1975

External Reference #:[id number]





Demographics







                          Address                   [address] 



                                                    [place] [address]

 

                          Home Phone                [phone]

 

                          Mobile Phone              [phone]

 

                          Email Address             [email or web address]

 

                          Preferred Language        en

 

                          Marital Status            Unknown

 

                          Episcopal Affiliation     Unknown

 

                          Race                      White

 

                          Ethnic Group              Not  or 









Author







                          Organization              [facility]

 

                          Address                   [address]



                                                    [place] [address]

 

                          Phone                     [phone]









Support







                Name            Relationship    Address         Phone

 

                Milady          Unavailable     [id number]     [phone]



                                                [place] [address] 

 

                Milady          Unavailable     [id number]     [phone]



                                                [place] [address] 









Care Team Providers







                    Name                Role                Phone

 

                    Biju Johnson         [phone]









PROBLEMS







        Type    Condition ICD9-CM OEF45-SQ Onset   Condition SNOMED Code Notes



                        Code    Code    Dates   Status          

 

        Problem Acute           F32.9           Active  068663426 



                depression                                         

 

        Problem History of         Z98.82          Active          



                breast implant                                         

 

        Problem Migraine         G43.909         Active  [id number] 



                without status                                         



                migrainosus,                                         



                not                                             



                intractable,                                         



                unspecified                                         



                migraine type                                         

 

        Problem GERD without         K21.9           Active  [id number] 



                esophagitis                                         

 

        Problem Nausea alone         R11.0           Active  [id number] 







ALLERGIES

No Known Allergies



ENCOUNTERS from 1975 to 2020-10-27







             Encounter    Location     Date         Provider     Diagnosis

 

             [address] 27 Oct, [place]                           



                          [id number]                             







IMMUNIZATIONS

No Information



SOCIAL HISTORY

Tobacco Use:





                    Social History Observation Description         Date

 

                    Details (start date - stop date) Former Smoker       



Sex Assigned At Birth:





                          Social History Observation Description

 

                          Sex Assigned At Birth     Unknown



PHQ9





                    Question            Answer              Notes

 

                    Little interest or pleasure in doing things Several days    

    

 

                    Feeling down, depressed, or hopeless Nearly every day    

 

                    Trouble falling or staying asleep or sleeping too much Not a

t all          

 

                    Feeling tired or having little energy More than half the day

s 

 

                    Poor appetite or overeating Nearly every day    

 

                    Feeling bad about yourself, or that you are a failure, More 

than half the days 



                    or have let yourself or your family down                    

 

 

                    Trouble concentrating on things, such as reading the Several

 days        



                    newspaper or watching television                     

 

                    Moving or speaking so slowly that other people could Not at 

all          



                    have noticed; or the opposite, being so fidgety or          

           



                    restless that you have been moving around a lot more        

             



                    than usual                              

 

                    Total Score         12                  

 

                    Interpretation      Moderate Depression 

 

                    Thoughts that you would be better off dead or of Not at all 

         



                    hurting yourself in some way                     



Alcohol Screen





                    Question            Answer              Notes

 

                    Did you have a drink containing alcohol in the past Yes     

            



                    year?                                   

 

                    Points              1                   

 

                    Interpretation      Negative            

 

                    How often did you have a drink containing alcohol in Monthly

 or less (1 point) 



                    the past year?                          



Tobacco Use/Smoking





                    Question            Answer              Notes

 

                    Are you a           former smoker       







REASON FOR REFERRAL

No Information



VITAL SIGNS

No information



MEDICATIONS







             Medication   SIG (Take, Route, Frequency, Start Date   End Date    

 Status



                          Duration)                              

 

             Sertraline HCl 25 MG 1 tablet Orally Once a day 28 Sep, 2020       

       Active



                          for 30 day(s)                           

 

             Promethazine HCl 25 mg 1 tablet as needed Orally              2020 Active



                          every 12 hrs for 30 days                           

 

             Ubrelvy 100 MG 1 tablet may take second dose 16 Oct, 2020 15 Nov, 

020 Active



                          at least 2 hours after first                          

 



                          dose as needed Orally Once a                          

 



                          day for 30 day(s)                           

 

             Dicyclomine HCl 20 MG 1 tablet Orally Four times a 27 Oct, 2020 5 N

2020  

Active



                          day as needed for cramping                           



                          for 10 days                            

 

             Lansoprazole 30 MG take 1 capsule by mouth once                    

       Active



                          daily for 30 days Oral Once a                         

  



                          day for 90 days                           







PROCEDURES

No Information



RESULTS

No Results



REASON FOR VISIT

Sick



MEDICAL (GENERAL) HISTORY







                    Type                Description         Date

 

                    Medical History     GERD without esophagitis 

 

                    Medical History     Migraine without status migrainosus, not

 intractable, 



                                        unspecified migraine type 

 

                    Medical History     Fibrocystic  Breast 

 

                    Medical History     Well woman exam with routine gynecologic

al exam 

 

                    Medical History     Acute vaginitis     

 

                    Surgical History    Breast  Implants  Saline  type. 1985







Goals Section

No Information



Health Concerns

No Information



MEDICAL EQUIPMENT

No Information



MENTAL STATUS

No Information



FUNCTIONAL STATUS

No Information



ASSESSMENTS

No Information



PLAN OF TREATMENT

Medication





                Medication Name Sig             Start Date      Stop Date

 

                Dicyclomine HCl 20 MG 1 tablet Orally Four times a day as 27 Oct

, 2020    5 Nov, 

2020



                                needed for cramping for 10 days                 



Next Appt





                                        Details

 

                                        Provider Name:Priya Ivy, 2020-10-28 02

:20:00 PM, [address], [address], [place], [address], [phone]

## 2020-11-15 NOTE — XMS REPORT
Summarization of Episode Note

                          Created on:2020



Patient:Aidee Henning

Sex:Female

:1975

External Reference #:[id number]





Demographics







                          Address                   [address] 



                                                    [place] [address]

 

                          Home Phone                [phone]

 

                          Mobile Phone              [phone]

 

                          Email Address             [email or web address]

 

                          Preferred Language        en

 

                          Marital Status            Unknown

 

                          Yarsani Affiliation     Unknown

 

                          Race                      White

 

                          Ethnic Group              Not  or 









Author







                          Organization              [facility]

 

                          Address                   [address]



                                                    [place] [address]

 

                          Phone                     [phone]









Support







                Name            Relationship    Address         Phone

 

                Milady          Unavailable     [address]     [phone]



                                                [place] [address] 

 

                Milady          Unavailable     [address]  [address]    [phone]



                                                [place] [address] 









Care Team Providers







                    Name                Role                Phone

 

                    Biju Johnson         [phone]









PROBLEMS







        Type    Condition ICD9-CM PIU02-AD Onset   Condition SNOMED Code Notes



                        Code    Code    Dates   Status          

 

        Problem Acute           F32.9           Active  244496733 



                depression                                         

 

        Problem History of         Z98.82          Active          



                breast implant                                         

 

        Problem Migraine         G43.909         Active  32940926 



                without status                                         



                migrainosus,                                         



                not                                             



                intractable,                                         



                unspecified                                         



                migraine type                                         

 

        Problem GERD without         K21.9           Active  595273087 



                esophagitis                                         

 

        Problem Nausea alone         R11.0           Active  261516225 







ALLERGIES

No Known Allergies



ENCOUNTERS from 1975 to 2020-11-10







             Encounter    Location     Date         Provider     Diagnosis

 

             [facility] [address] [facility] 10 Nov, 2020 Priya Raymond  [place]

e depression 

F32.9



             [facility] [address] [place] [address],                           ; [place]

n ivy dermatitis



                          [place] [address]                           L23.7 ; Migrai

ne



                                                                 without status



                                                                 migrainosus, no

t



                                                                 intractable,



                                                                 unspecified lesly

michelle



                                                                 type G43.909 an

d



                                                                 Encounter for



                                                                 administration 

of



                                                                 vaccine Z23







IMMUNIZATIONS







                Vaccine         Route           Administration Date Status

 

                Flucelvax - single dose syringe IM Intramuscular Nov 10, 2020   

 Administered

 

                Kenalog (Triamcinolone) IM Intramuscular Nov 10, 2020    Adminis

tered

 

                Dexamethasone   IM Intramuscular Nov 10, 2020    Administered







SOCIAL HISTORY

Tobacco Use:





                    Social History Observation Description         Date

 

                    Details (start date - stop date) Former Smoker       



Sex Assigned At Birth:





                          Social History Observation Description

 

                          Sex Assigned At Birth     Unknown



PHQ9





                    Question            Answer              Notes

 

                    Little interest or pleasure in doing things More than half t

he days 

 

                    Feeling down, depressed, or hopeless More than half the days

 

 

                    Trouble falling or staying asleep or sleeping too More than 

half the days 



                    much                                    

 

                    Feeling tired or having little energy More than half the day

s 

 

                    Poor appetite or overeating Nearly every day    

 

                    Feeling bad about yourself, or that you are a Nearly every d

ay    



                    failure, or have let yourself or your family down           

          

 

                    Trouble concentrating on things, such as reading Several day

s        



                    the newspaper or watching television                     

 

                    Moving or speaking so slowly that other people Not at all   

       



                    could have noticed; or the opposite, being so               

      



                    fidgety or restless that you have been moving               

      



                    around a lot more than usual                     

 

                    Total Score         15                  

 

                    Interpretation      Moderately severe depression 

 

                    Thoughts that you would be better off dead or of Not at all 

         



                    hurting yourself in some way                     



Alcohol Screen





                    Question            Answer              Notes

 

                    Did you have a drink containing alcohol in the past Yes     

            



                    year?                                   

 

                    Points              1                   

 

                    Interpretation      Negative            

 

                    How often did you have a drink containing alcohol in Monthly

 or less (1 point) 



                    the past year?                          



Tobacco Use/Smoking





                    Question            Answer              Notes

 

                    Are you a           former smoker       







REASON FOR REFERRAL

No Information



VITAL SIGNS







                    Height              65 in               10 Nov, 2020

 

                    Weight              163.6 lbs           10 Nov, 2020

 

                    Temperature         98.6 degrees Fahrenheit 10 Nov, 2020

 

                    BMI                 27.22 kg/m2         10 Nov, 2020

 

                    Oximetry            97 %                10 Nov, 2020

 

                    Respiratory Rate    16 /min             10 Nov, 2020

 

                    Blood pressure systolic 116 mm Hg           10 Nov, 2020

 

                    Blood pressure diastolic 62 mm Hg            10 Nov, 2020







MEDICATIONS







             Medication   SIG (Take, Route, Frequency, Start Date   End Date    

 Status



                          Duration)                              

 

             Sertraline HCl 50 MG 1 tablet Orally Once a day for                

           Active



                          30 days                                

 

             Ubrelvy 100 MG 1 tablet may take second dose 16 Oct, 2020 15 Nov, 2

020 Active



                          at least 2 hours after first                          

 



                          dose as needed Orally Once a                          

 



                          day for 30 day(s)                           

 

             Lansoprazole 30 MG take 1 capsule by mouth once                    

       Active



                          daily for 30 days Oral Once a                         

  



                          day for 90 days                           







PROCEDURES

No Information



RESULTS

No Results



REASON FOR VISIT

poison jasper [id number]



MEDICAL (GENERAL) HISTORY







                    Type                Description         Date

 

                    Medical History     GERD without esophagitis 

 

                    Medical History     Migraine without status migrainosus, not

 intractable, 



                                        unspecified migraine type 

 

                    Medical History     Fibrocystic  Breast 

 

                    Medical History     Well woman exam with routine gynecologic

al exam 

 

                    Medical History     Acute vaginitis     

 

                    Surgical History    Breast  Implants  Saline  type. 1985







Goals Section

No Information



Health Concerns

No Information



MEDICAL EQUIPMENT

No Information



MENTAL STATUS

No Information



FUNCTIONAL STATUS

No Information



ASSESSMENTS







                    Encounter Date      Diagnosis           Notes

 

                    10 Nov, 2020        Encounter for administration of vaccine 

(ICD-10 - Z23) 

 

                    10 Nov, 2020        Migraine without status migrainosus, not

 intractable, 



                                        unspecified migraine type (ICD-10 - G43.

909) 

 

                    10 Nov, 2020        Poison ivy dermatitis (ICD-10 - L23.7) 

 

                    10 Nov, 2020        Acute depression (ICD-10 - F32.9) 







PLAN OF TREATMENT

Medication





                Medication Name Sig             Start Date      Stop Date

 

                Sertraline HCl 50 MG 1 tablet Orally Once a day for 30 days     

            



Treatment Notes





                    Assessment          Notes               Clinical Notes

 

                    Acute depression    med as directed inc dose of 



                                        sertraline to 50 mg daily 

 

                    Poison ivy dermatitis med as directed     

 

                    Migraine without status migrainosus, sample:  Lot# [id number]

[id number] 2022 



                    not intractable, unspecified 2 boxes take 1 tab at beginning

 of 



                    migraine type       HA.                 



Next Appt





                                        Details

 

                                        4 Weeks Reason:

 

                                        Provider Name:Priya Ivy 2020 09

:40:00 AM, [address] [facility], [address], [place], [address], [phone]

## 2020-11-15 NOTE — XMS REPORT
Summarization of Episode Note

                           Created on:2020



Patient:Aidee Henning

Sex:Female

:1975

External Reference #:[id number]





Demographics







                          Address                   [address] 



                                                    [place] [address]

 

                          Home Phone                [phone]

 

                          Mobile Phone              [phone]

 

                          Email Address             [email or web address]

 

                          Preferred Language        en

 

                          Marital Status            Unknown

 

                          Mormon Affiliation     Unknown

 

                          Race                      White

 

                          Ethnic Group              Not  or 









Author







                          Organization              [facility]

 

                          Address                   [address]



                                                    [place] [address]

 

                          Phone                     [phone]









Support







                Name            Relationship    Address         Phone

 

                Milady          Unavailable     [id number]     [phone]



                                                [place] [address] 

 

                Milady          Unavailable     [address]     [phone]



                                                [place] [address] 









Care Team Providers







                    Name                Role                Phone

 

                    Biju Johnson         [phone]









PROBLEMS







        Type    Condition ICD9-CM XCT76-BF Onset   Condition SNOMED Code Notes



                        Code    Code    Dates   Status          

 

        Problem Acute           F32.9           Active  795854742 



                depression                                         

 

        Problem History of         Z98.82          Active          



                breast implant                                         

 

        Problem Migraine         G43.909         Active  97450373 



                without status                                         



                migrainosus,                                         



                not                                             



                intractable,                                         



                unspecified                                         



                migraine type                                         

 

        Problem GERD without         K21.9           Active  257171764 



                esophagitis                                         

 

        Problem Nausea alone         R11.0           Active  254821799 







ALLERGIES

No Known Allergies



ENCOUNTERS from 1975 to 2020-10-03







             Encounter    Location     Date         Provider     Diagnosis

 

             [facility] [address] [facility] [address] Sep, 2020 Priya Ivy  GERD

 without



             [facility] [address],                           esophag

itis K21.9 ;



                          [place] [address]                           Nausea alone R

11.0 ;



                                                                 Acute depressio

n



                                                                 F32.9 and Migra

ine



                                                                 without status



                                                                 migrainosus, no

t



                                                                 intractable,



                                                                 unspecified lesly

michelle



                                                                 type G43.909







IMMUNIZATIONS

No Information



SOCIAL HISTORY

Tobacco Use:





                    Social History Observation Description         Date

 

                    Details (start date - stop date) Former Smoker       



Sex Assigned At Birth:





                          Social History Observation Description

 

                          Sex Assigned At Birth     Unknown



PHQ9





                    Question            Answer              Notes

 

                    Little interest or pleasure in doing things Several days    

    

 

                    Feeling down, depressed, or hopeless Nearly every day    

 

                    Trouble falling or staying asleep or sleeping too much Not a

t all          

 

                    Feeling tired or having little energy More than half the day

s 

 

                    Poor appetite or overeating Nearly every day    

 

                    Feeling bad about yourself, or that you are a failure, More 

than half the days 



                    or have let yourself or your family down                    

 

 

                    Trouble concentrating on things, such as reading the Several

 days        



                    newspaper or watching television                     

 

                    Moving or speaking so slowly that other people could Not at 

all          



                    have noticed; or the opposite, being so fidgety or          

           



                    restless that you have been moving around a lot more        

             



                    than usual                              

 

                    Total Score         12                  

 

                    Interpretation      Moderate Depression 

 

                    Thoughts that you would be better off dead or of Not at all 

         



                    hurting yourself in some way                     



Alcohol Screen





                    Question            Answer              Notes

 

                    Did you have a drink containing alcohol in the past Yes     

            



                    year?                                   

 

                    Points              1                   

 

                    Interpretation      Negative            

 

                    How often did you have a drink containing alcohol in Monthly

 or less (1 point) 



                    the past year?                          



Tobacco Use/Smoking





                    Question            Answer              Notes

 

                    Are you a           former smoker       







REASON FOR REFERRAL

No Information



VITAL SIGNS







                    Height              65 in               28 Sep, 2020

 

                    Weight              164 lbs             28 Sep, 2020

 

                    Temperature         98.0 degrees Fahrenheit 28 Sep, 2020

 

                    BMI                 27.29 kg/m2         28 Sep, 2020

 

                    Oximetry            98 %                28 Sep, 2020

 

                    Respiratory Rate    16 /min             28 Sep, 2020

 

                    Blood pressure systolic 115 mm Hg           28 Sep, 2020

 

                    Blood pressure diastolic 69 mm Hg            28 Sep, 2020







MEDICATIONS







             Medication   SIG (Take, Route, Frequency, Start Date   End Date    

 Status



                          Duration)                              

 

             Promethazine HCl 25 mg 1 tablet as needed Orally              2020 Active



                          every 12 hrs for 30 days                           

 

             Sertraline HCl 25 MG 1 tablet Orally Once a day 28 Sep, 2020       

       Active



                          for 30 day(s)                           

 

             Lansoprazole 30 MG take 1 capsule by mouth once                    

       Active



                          daily for 30 days Oral Once a                         

  



                          day for 90 days                           







PROCEDURES

No Information



RESULTS

No Results



REASON FOR VISIT

Re-Establish Care/Switching from [place] ([place] [facility])



MEDICAL (GENERAL) HISTORY







                    Type                Description         Date

 

                    Medical History     GERD without esophagitis 

 

                    Medical History     Migraine without status migrainosus, not

 intractable, 



                                        unspecified migraine type 

 

                    Medical History     Fibrocystic  Breast 

 

                    Surgical History    Breast  Implants  Saline  type. 1985







Goals Section

No Information



Health Concerns

No Information



MEDICAL EQUIPMENT

No Information



MENTAL STATUS

No Information



FUNCTIONAL STATUS

No Information



ASSESSMENTS







                    Encounter Date      Diagnosis           Notes

 

                    28 Sep, 2020        Migraine without status migrainosus, not

 intractable, 



                                        unspecified migraine type (ICD-10 - G43.

909) 

 

                    28 Sep, 2020        Acute depression (ICD-10 - F32.9) 

 

                    28 Sep, 2020        Nausea alone (ICD-10 - R11.0) 

 

                    28 Sep, 2020        GERD without esophagitis (ICD-10 - K21.9

) 







PLAN OF TREATMENT

Medication





                Medication Name Sig             Start Date      Stop Date

 

                Promethazine HCl 25 mg 1 tablet as needed Orally every 12       

          28 Oct, 2020



                                hrs for 30 days                 

 

                Lansoprazole 30 MG take 1 capsule by mouth once daily           

      



                                for 30 days Oral Once a day for 90              

   



                                days                            

 

                Sertraline HCl 25 MG 1 tablet Orally Once a day for  Sep, 2

020    



                                day(s)                          



Treatment Notes





                    Assessment          Notes               Clinical Notes

 

                    GERD without esophagitis Med as directedGet galbladder 



                                        removed as soon as possible 

 

                    Nausea alone        med as direcetd when needed 

 

                    Acute depression    starte new med as directed daily 

 

                    Migraine without status med as directed when needed 



                    migrainosus, not intractable,                     



                    unspecified migraine type                     



Next Appt





                                        Details

 

                                        4 Weeks Reason:

 

                                        Provider Name:Priya Ivy, 2020-10-26 04

:00:00 PM, [address] [facility], [address], [place], [address], [phone]







Insurance Providers







          Payer Name Payer     Payer     Insured   Patient   Coverage  Coverage 

End



                    Address   Phone     Name      Relationship to Start Date Angelito

e



                                                  Insured             

 

          [employer] from [address]    [phone] Aidee Henning self                



          [place]  [id number]    [phone]       [facility]                                         



                    [id number]

## 2021-01-18 ENCOUNTER — HOSPITAL ENCOUNTER (EMERGENCY)
Dept: HOSPITAL 97 - ER | Age: 46
Discharge: HOME | End: 2021-01-18
Payer: COMMERCIAL

## 2021-01-18 VITALS — SYSTOLIC BLOOD PRESSURE: 105 MMHG | DIASTOLIC BLOOD PRESSURE: 71 MMHG

## 2021-01-18 VITALS — OXYGEN SATURATION: 100 % | TEMPERATURE: 97.6 F

## 2021-01-18 DIAGNOSIS — Z87.891: ICD-10-CM

## 2021-01-18 DIAGNOSIS — U07.1: Primary | ICD-10-CM

## 2021-01-18 DIAGNOSIS — F32.9: ICD-10-CM

## 2021-01-18 DIAGNOSIS — K21.9: ICD-10-CM

## 2021-01-18 LAB — SARS-COV-2 RNA RESP QL NAA+PROBE: POSITIVE

## 2021-01-18 PROCEDURE — 99283 EMERGENCY DEPT VISIT LOW MDM: CPT

## 2021-01-18 PROCEDURE — 0240U: CPT

## 2021-01-18 PROCEDURE — 87070 CULTURE OTHR SPECIMN AEROBIC: CPT

## 2021-01-18 PROCEDURE — 87081 CULTURE SCREEN ONLY: CPT

## 2021-01-18 NOTE — EDPHYS
Physician Documentation                                                                           

 [facility]                                                                 

Name: Aidee Henning                                                                                 

Age: 45 yrs                                                                                       

Sex: Female                                                                                       

: 1975                                                                                   

MRN: [id number]                                                                                   

Arrival Date: 2021                                                                          

Time: 16:28                                                                                       

Account#: [id number]                                                                            

Bed 29                                                                                            

Private MD:                                                                                       

DARRIAN Physician Joey Cheng                                                                      

HPI:                                                                                              

                                                                                             

18:22 This 45 yrs old  Female presents to ER via Ambulatory with complaints of Sore  kb  

      Throat, Cough, Chest Tightness.                                                             

18:23 The patient or guardian reports cough, that is intermittent, described as mild, with no kb  

      sputum. Onset: The symptoms/episode began/occurred this morning. Severity of symptoms:      

      At their worst the symptoms were mild, in the emergency department the symptoms are         

      unchanged. Modifying factors: The symptoms are alleviated by nothing, the symptoms are      

      aggravated by nothing. Associated signs and symptoms: Pertinent positives: sore throat.     

      The patient has not experienced similar symptoms in the past. The patient has not           

      recently seen a physician. Pt states she has had mild cough, congestion and sore throat     

      that began this morning. Daughter has multiple covid symptoms and had an exposure so        

      she wanted to make sure that isn't what she has.                                            

                                                                                                  

OB/GYN:                                                                                           

16:44 LMP 2020                                                                          ca1 

                                                                                                  

Historical:                                                                                       

- Allergies:                                                                                      

16:44 No Known Allergies;                                                                     ca1 

- Home Meds:                                                                                      

16:44 pantoprazole Oral [Active]; Zoloft Oral [Active];                                       ca1 

- PMHx:                                                                                           

16:44 Depression; GERD; PUD;                                                                  ca1 

- PSHx:                                                                                           

16:44 Tubal ligation;                                                                         ca1 

                                                                                                  

- Immunization history:: Flu vaccine is up to date.                                               

- Social history:: Smoking status: Patient/guardian denies using tobacco, the patient             

  reports quitting approximately 30 years ago.                                                    

                                                                                                  

                                                                                                  

ROS:                                                                                              

18:25 Constitutional: Negative for fever, chills, and weight loss, Cardiovascular: Negative   kb  

      for chest pain, palpitations, and edema, Abdomen/GI: Negative for abdominal pain,           

      nausea, vomiting, diarrhea, and constipation, MS/Extremity: Negative for injury and         

      deformity, Skin: Negative for injury, rash, and discoloration, Neuro: Negative for          

      headache, weakness, numbness, tingling, and seizure.                                        

18:25 ENT: Positive for sinus congestion, sore throat.                                            

18:25 Respiratory: Positive for cough.                                                            

                                                                                                  

Exam:                                                                                             

18:25 Constitutional:  This is a well developed, well nourished patient who is awake, alert,  kb  

      and in no acute distress. Head/Face:  Normocephalic, atraumatic. Chest/axilla:  Normal      

      chest wall appearance and motion.  Nontender with no deformity.  No lesions are             

      appreciated. Cardiovascular:  Regular rate and rhythm with a normal S1 and S2.  No          

      gallops, murmurs, or rubs.  Normal PMI, no JVD.  No pulse deficits. Respiratory:  Lungs     

      have equal breath sounds bilaterally, clear to auscultation and percussion.  No rales,      

      rhonchi or wheezes noted.  No increased work of breathing, no retractions or nasal          

      flaring. Abdomen/GI:  Soft, non-tender, with normal bowel sounds.  No distension or         

      tympany.  No guarding or rebound.  No evidence of tenderness throughout. Skin:  Warm,       

      dry with normal turgor.  Normal color with no rashes, no lesions, and no evidence of        

      cellulitis. MS/ Extremity:  Pulses equal, no cyanosis.  Neurovascular intact.  Full,        

      normal range of motion. Neuro:  Awake and alert, GCS 15, oriented to person, place,         

      time, and situation.  Cranial nerves II-XII grossly intact.  Motor strength 5/5 in all      

      extremities.  Sensory grossly intact.  Cerebellar exam normal.  Normal gait.                

                                                                                                  

Vital Signs:                                                                                      

16:41  / 74; Pulse 79; Resp 15 S; Temp 97.6(TE); Pulse Ox 100% on R/A; Weight 74.84 kg  ca1 

      (R); Height 5 ft. 6 in. (167.64 cm) (R); Pain 0/10;                                         

18:50  / 71; Pulse 81; Resp 16 S; Pulse Ox 100% on R/A;                                 ca1 

16:41 Body Mass Index 26.63 (74.84 kg, 167.64 cm)                                             ca1 

                                                                                                  

MDM:                                                                                              

16:40 Patient medically screened.                                                             kb  

18:24 Data reviewed: vital signs, nurses notes. Data interpreted: Pulse oximetry: on room air kb  

      is 100 %. Interpretation: normal.                                                           

18:40 Counseling: I had a detailed discussion with the patient and/or guardian regarding: the kb  

      historical points, exam findings, and any diagnostic results supporting the                 

      discharge/admit diagnosis, lab results, the need for outpatient follow up, a family         

      practitioner, to return to the emergency department if symptoms worsen or persist or if     

      there are any questions or concerns that arise at home.                                     

                                                                                                  

                                                                                             

16:41 Order name: Strep; Complete Time: 17:35                                                 kb  

                                                                                             

17:34 Order name: Throat Culture                                                              EDMS

                                                                                             

18:33 Order name: COVID-19/FLU A+B; Complete Time: 18:36                                      EDMS

                                                                                                  

Administered Medications:                                                                         

No medications were administered                                                                  

                                                                                                  

                                                                                                  

Disposition:                                                                                      

                                                                                             

06:37 Co-signature as Attending Physician, Joey Cheng MD I agree with the assessment and  [facility] 

      plan of care.                                                                               

                                                                                                  

Disposition:                                                                                      

21 18:41 Discharged to Home. Impression: Coronavirus infection, unspecified.                

- Condition is Stable.                                                                            

- Discharge Instructions: Viral Respiratory Infection, Easy-To-Read, COVID-19.                    

                                                                                                  

- Medication Reconciliation Form, Thank You Letter, Antibiotic Education, Prescription            

  Opioid Use, Work release form form.                                                             

- Follow up: Emergency Department; When: As needed; Reason: Worsening of condition.               

  Follow up: Private Physician; When: 2 - 3 days; Reason: Recheck today's complaints,             

  Continuance of care, Re-evaluation by your physician.                                           

                                                                                                  

                                                                                                  

                                                                                                  

Signatures:                                                                                       

Dispatcher [employer]                           [facility]                                                 

Swathi Mccormick, FNP-C                 RENP-Joey Jaramillo MD MD   vipul                                                  

Kevinob, Joy, RN                        RN   ca1                                                  

                                                                                                  

Corrections: (The following items were deleted from the chart)                                    

                                                                                             

17:18 16:41 CORONAVIRUS+MR.LAB.BRZ ordered. [facility]

17:18 16:41 Influenza Screen (A \T\ B)+BA.LAB.BRZ ordered. [facility]

18:58 18:41 2021 18:41 Discharged to Home. Impression: Coronavirus infection,           ca1 

      unspecified. Condition is Stable. Forms are Medication Reconciliation Form, Thank You       

      Letter, Antibiotic Education, Prescription Opioid Use. Follow up: Emergency Department;     

      When: As needed; Reason: Worsening of condition. Follow up: Private Physician; When: 2      

      - 3 days; Reason: Recheck today's complaints, Continuance of care, Re-evaluation by         

      your physician. kb                                                                          

                                                                                                  

**************************************************************************************************

## 2021-01-18 NOTE — ER
Nurse's Notes                                                                                     

 [facility]                                                                 

Name: Aidee Henning                                                                                 

Age: 45 yrs                                                                                       

Sex: Female                                                                                       

: 1975                                                                                   

MRN: [id number]                                                                                   

Arrival Date: 2021                                                                          

Time: 16:28                                                                                       

Account#: [id number]                                                                            

Bed 29                                                                                            

Private MD:                                                                                       

Diagnosis: Coronavirus infection, unspecified                                                     

                                                                                                  

Presentation:                                                                                     

                                                                                             

16:41 Chief complaint: Patient states: This morning, started having cough, sore throat and    ca1 

      congestion. Coronavirus screen: Client denies travel out of the [place] in the last 14         

      days. congestion, cough unrelated to allergies, sore throat, Client presents with at        

      least one sign or symptom that may indicate coronavirus-19. Standard/surgical mask          

      placed on the client. Provider contacted for isolation considerations. Ebola Screen:        

      Patient negative for fever greater than or equal to 101.5 degrees Fahrenheit, and           

      additional compatible Ebola Virus Disease symptoms Patient denies exposure to               

      infectious person. Patient denies travel to an Ebola-affected area in the 21 days           

      before illness onset. No symptoms or risks identified at this time. Initial Sepsis          

      Screen: Does the patient meet any 2 criteria? No. Patient's initial sepsis screen is        

      negative. Does the patient have a suspected source of infection? No. Patient's initial      

      sepsis screen is negative. Risk Assessment: Do you want to hurt yourself or someone         

      else? Patient reports no desire to harm self or others. Onset of symptoms was 2021.                                                                                   

16:41 Method Of Arrival: Ambulatory                                                           ca1 

16:41 Acuity: BALTAZAR 4                                                                           ca1 

                                                                                                  

OB/GYN:                                                                                           

16:44 LMP 2020                                                                          ca1 

                                                                                                  

Historical:                                                                                       

- Allergies:                                                                                      

16:44 No Known Allergies;                                                                     ca1 

- Home Meds:                                                                                      

16:44 pantoprazole Oral [Active]; Zoloft Oral [Active];                                       ca1 

- PMHx:                                                                                           

16:44 Depression; GERD; PUD;                                                                  ca1 

- PSHx:                                                                                           

16:44 Tubal ligation;                                                                         ca1 

                                                                                                  

- Immunization history:: Flu vaccine is up to date.                                               

- Social history:: Smoking status: Patient/guardian denies using tobacco, the patient             

  reports quitting approximately 30 years ago.                                                    

                                                                                                  

                                                                                                  

Screenin:34 Abuse screen: Denies threats or abuse. Denies injuries from another. Nutritional        ca1 

      screening: No deficits noted. Tuberculosis screening: No symptoms or risk factors           

      identified. Fall Risk None identified.                                                      

                                                                                                  

Assessment:                                                                                       

18:34 General: Appears in no apparent distress. comfortable, Behavior is calm, cooperative,   ca1 

      appropriate for age. Pain: Denies pain. Neuro: Level of Consciousness is awake, alert,      

      obeys commands, Oriented to person, place, time, situation. Respiratory: Reports cough      

      that is Airway is patent Respiratory effort is even, unlabored, Breath sounds are clear     

      bilaterally. EENT: Throat is clear is pink. Derm: Skin is intact, is healthy with good      

      turgor, Skin is pink, warm \T\ dry. Musculoskeletal: Circulation, motion, and sensation     

      intact. Capillary refill < 3 seconds.                                                       

18:35 EENT: Reports nasal congestion.                                                         ca1 

                                                                                                  

Vital Signs:                                                                                      

16:41  / 74; Pulse 79; Resp 15 S; Temp 97.6(TE); Pulse Ox 100% on R/A; Weight 74.84 kg  ca1 

      (R); Height 5 ft. 6 in. (167.64 cm) (R); Pain 0/10;                                         

18:50  / 71; Pulse 81; Resp 16 S; Pulse Ox 100% on R/A;                                 ca1 

16:41 Body Mass Index 26.63 (74.84 kg, 167.64 cm)                                             ca1 

                                                                                                  

ED Course:                                                                                        

16:28 Patient arrived in ED.                                                                  as  

16:40 [facility]                                                        kb  

16:40 Joey Cheng MD is Attending Physician.                                             kb  

16:43 Triage completed.                                                                       ca1 

16:44 Arm band placed on right wrist.                                                         ca1 

18:34 Patient has correct armband on for positive identification. Bed in low position. Call   ca1 

      light in reach. Side rails up X 1. Pulse ox on. NIBP on.                                    

18:49 Acob, Joy, RN is Primary Nurse.                                                      ca1 

18:50 No provider procedures requiring assistance completed. Patient did not have IV access   ca1 

      during this emergency room visit.                                                           

                                                                                                  

Administered Medications:                                                                         

No medications were administered                                                                  

                                                                                                  

                                                                                                  

Outcome:                                                                                          

18:41 Discharge ordered by MD.                                                                kb  

18:50 Discharged to home ambulatory.                                                          ca1 

18:50 Condition: stable                                                                           

18:50 Discharge instructions given to patient, Instructed on discharge instructions, follow       

      up and referral plans. Demonstrated understanding of instructions, follow-up care.          

18:58 Patient left the ED.                                                                    ca1 

                                                                                                  

Signatures:                                                                                       

Swathi Mccormick FNP-C FNP-Cristy Rachel                             as                                                   

Joy Krishna RN                        RN   ca1                                                  

                                                                                                  

Corrections: (The following items were deleted from the chart)                                    

18:35 18:34 General: Appears in no apparent distress. comfortable, Behavior is calm,          ca1 

      cooperative, appropriate for age, ca1                                                       

18:35 18:34 Respiratory: Airway is patent Respiratory effort is even, unlabored, Breath       ca1 

      sounds are clear bilaterally. ca1                                                           

                                                                                                  

**************************************************************************************************

## 2021-01-18 NOTE — XMS REPORT
Summarization of Episode Note

                          Created on:December 15, 2020



Patient:Aidee Henning

Sex:Female

:1975

External Reference #:[id number]





Demographics







                          Address                   [address]



                                                    [place] [address]

 

                          Home Phone                [phone]

 

                          Mobile Phone              [phone]

 

                          Email Address             [email or web address]

 

                          Preferred Language        en

 

                          Marital Status            Unknown

 

                          Yazidi Affiliation     Unknown

 

                          Race                      White

 

                          Ethnic Group              Not  or 









Author







                          Organization              [facility]

 Group

 

                          Address                   [address]



                                                    [place] [address]

 

                          Phone                     [phone]









Support







                Name            Relationship    Address         Phone

 

                Milady          Unavailable     [id number]     [phone]



                                                [place] [address] 

 

                Milady          Unavailable     [id number]     [phone]



                                                [place] [address] 









Care Team Providers







                    Name                Role                Phone

 

                    Biju Johnson         [phone]









PROBLEMS







        Type    Condition ICD9-CM WZX09-RM Onset   Condition SNOMED Code Notes



                        Code    Code    Dates   Status          

 

        Problem Acute           F32.9           Active  848526474 



                depression                                         

 

        Problem History of         Z98.82          Active          



                breast implant                                         

 

        Problem Migraine         G43.909         Active  65898295 



                without status                                         



                migrainosus,                                         



                not                                             



                intractable,                                         



                unspecified                                         



                migraine type                                         

 

        Problem GERD without         K21.9           Active  410413714 



                esophagitis                                         

 

        Problem Nausea alone         R11.0           Active  401371614 







ALLERGIES

No Known Allergies



ENCOUNTERS from 1975 to 2020







             Encounter    Location     Date         Provider     Diagnosis

 

             [facility] [address] [facility] [address] Dec, 2020 Priya Raines

[place] [address]                           







IMMUNIZATIONS







                Vaccine         Route           Administration Date Status

 

                Flucelvax - single dose syringe IM Intramuscular Nov 10, 2020   

 Administered

 

                Kenalog (Triamcinolone) IM Intramuscular Nov 10, 2020    Adminis

tered

 

                Dexamethasone   IM Intramuscular Nov 10, 2020    Administered







SOCIAL HISTORY

Tobacco Use:





                    Social History Observation Description         Date

 

                    Details (start date - stop date) Former Smoker       



Sex Assigned At Birth:





                          Social History Observation Description

 

                          Sex Assigned At Birth     Unknown



PHQ9





                    Question            Answer              Notes

 

                    Little interest or pleasure in doing things More than half t

he days 

 

                    Feeling down, depressed, or hopeless More than half the days

 

 

                    Trouble falling or staying asleep or sleeping too More than 

half the days 



                    much                                    

 

                    Feeling tired or having little energy More than half the day

s 

 

                    Poor appetite or overeating Nearly every day    

 

                    Feeling bad about yourself, or that you are a Nearly every d

ay    



                    failure, or have let yourself or your family down           

          

 

                    Trouble concentrating on things, such as reading Several day

s        



                    the newspaper or watching television                     

 

                    Moving or speaking so slowly that other people Not at all   

       



                    could have noticed; or the opposite, being so               

      



                    fidgety or restless that you have been moving               

      



                    around a lot more than usual                     

 

                    Total Score         15                  

 

                    Interpretation      Moderately severe depression 

 

                    Thoughts that you would be better off dead or of Not at all 

         



                    hurting yourself in some way                     



Alcohol Screen





                    Question            Answer              Notes

 

                    Did you have a drink containing alcohol in the past Yes     

            



                    year?                                   

 

                    Points              1                   

 

                    Interpretation      Negative            

 

                    How often did you have a drink containing alcohol in Monthly

 or less (1 point) 



                    the past year?                          



Tobacco Use/Smoking





                    Question            Answer              Notes

 

                    Are you a           former smoker       







REASON FOR REFERRAL

No Information



VITAL SIGNS

No information



MEDICATIONS







           Medication SIG (Take, Route, Frequency, Notes      Start Date End Angelito

e   Status



                      Duration)                                   

 

           Sertraline HCl 50 MG 1 tablet Orally Once a day                      

            Active



                      for 30 days                                  

 

           Promethazine HCl 25 mg 1 tablet as needed Orally                     

             Active



                      every 12 hrs for 15                                  

 

           Lansoprazole 30 MG take 1 capsule by mouth once                      

            Active



                      daily for 30 days Oral Once a                             

     



                      day for 90 days                                  







PROCEDURES

No Information



RESULTS

No Results



REASON FOR VISIT

COVID test - NEGATIVE



MEDICAL (GENERAL) HISTORY







                    Type                Description         Date

 

                    Medical History     GERD without esophagitis 

 

                    Medical History     Migraine without status migrainosus, not

 intractable, 



                                        unspecified migraine type 

 

                    Medical History     Fibrocystic  Breast 

 

                    Medical History     Well woman exam with routine gynecologic

al exam 

 

                    Medical History     Acute vaginitis     

 

                    Surgical History    Breast  Implants  Saline  type. 1985







Goals Section

No Information



Health Concerns

No Information



MEDICAL EQUIPMENT

No Information



MENTAL STATUS

No Information



FUNCTIONAL STATUS

No Information



ASSESSMENTS

No Information



PLAN OF TREATMENT

No Information



Insurance Providers







          Payer Name Payer     Payer     Insured   Patient   Coverage  Coverage 

End



                    Address   Phone     Name      Relationship to Start Date Angelito

e



                                                  Insured             

 

          [employer] from [address]    [phone] Aidee Henning self      2020 



          [place]  [id number]                             



          [facility]                                         



                    [id number]

## 2021-01-18 NOTE — XMS REPORT
Continuity of Care Document

                           Created on:2021



Patient:JAEL HILL

Sex:Female

:1975

External Reference #:[id number]





Demographics







                          Address                   [address] [place] [address] [address]



                                                    [place] [address]

 

                          Home Phone                [phone]

 

                          Work Phone                [phone]

 

                          Mobile Phone              [phone]

 

                          Email Address             [email or web address]

 

                          Preferred Language        English

 

                          Marital Status            Unknown

 

                          Jehovah's witness Affiliation     Unknown

 

                          Race                      Unknown

 

                          Additional Race(s)        Unavailable



                                                    White

 

                          Ethnic Group              Unknown









Author







                          Organization              [facility]

t

 

                          Address                   [address]



                                                    [place] [address]

 

                          Phone                     [phone]









Support







                Name            Relationship    Address         Phone

 

                Milady          Unavailable     [address]    [phone]



                                                [place] [address] 

 

                Milady          Unavailable     [address]  [address]    [phone]



                                                [place] [address] 









Care Team Providers







                    Name                Role                Phone

 

                    Unavailable         Unavailable         Unavailable









Problems

This patient has no known problems.



Allergies, Adverse Reactions, Alerts

This patient has no known allergies or adverse reactions.



Medications







       Ordered Filled Start  Stop   Current Ordering Indication Dosage Frequency

 Signature

                    Comments            Components          Source



     Medication Medication Date Date Medication? Clinician                (SIG) 

          



     Name Name                                                   

 

     Lexapro Lexapro -0      Yes  Valerie                1 tablet           C

[facility]



               1-09           [facility]ee[facility]                               [facility]



               [facility]

 

     Prevacid Prevacid           Yes  Valerie                1 capsule           

[facility]



                              Meehan                               [facility]



                                                                 [facility]



                                                                 l



                                                                 [facility]



                                                                 ent



                                                                 Clinics

 

     Promethazin Promethazin           Yes  Valerie                1 tablet      

     [facility]



     e HCl e HCl                Meehan                as needed           [facility]



                                                  for  N/V           [facility]



                                                                 l



                                                                 [facility]



                                                                 ent



                                                                 Clinics







Procedures

This patient has no known procedures.



Encounters







        Start   End     Encounter Admission Attending Care    Care    Encounter 

Source



        Date/Time Date/Time Type    Type    Clinicians Facility Department ID   

   

 

        2020-12-10 2020-12-10 Outpatient                 [facility]  [id number]

 [facility]



        [facility]00                                                 [facility]

 

        2020-12-10 2020-12-10 Outpatient                 [facility]  [id number]

 [facility]



        00[facility]00                                                 [facility]



                                                                        [facility]



                                                                        l



                                                                        [facility]



                                                                        ent



                                                                        Clinics

 

        2020-11-10 2020-11-10 Outpatient                 [facility]  [id number]

 [facility]



        00[facility]00                                                 [facility]



                                                                        [facility]



                                                                        l



                                                                        [facility]



                                                                        ent



                                                                        Clinics

 

        2020-10-27 2020-10-27 Outpatient                 [facility]  [id number]

 [facility]



        00[facility]00                                                 [facility]



                                                                        [facility]



                                                                        l



                                                                        [facility]pati



                                                                        ent



                                                                        Clinics

 

        2020-10-27 2020-10-27 Outpatient                 [facility]  [facility]  [id number]

 [facility]



        00[facility]00                                                 [facility]



                                                                        [facility]



                                                                        l



                                                                        [facility]pati



                                                                        ent



                                                                        Clinics

 

        2020-10-16 2020-10-16 [facility]  [id number]

 [facility]



        00[facility]00                                                 [facility]



                                                                        [facility]



                                                                        l



                                                                        Outpati



                                                                        ent



                                                                        Clinics

 

        2020-10-08 2020-10-08 Outpatient                 [facility]  [id number]

 [facility]



        00[facility]00[facility]00                                                 [facility]



                                                                        [facility]



                                                                        l



                                                                        Outpati



                                                                        ent



                                                                        Clinics

 

        2020 [facility]                 [facility]  [id number]

 [facility]



        00:[facility]00                                                 [facility]



                                                                        [facility]



                                                                        l



                                                                        Outpati



                                                                        ent



                                                                        Clinics

 

        2020-05-15 2020-05-15 Outpatient                 [facility]

[id number] [facility]



        14:46:00 14:46:00                         [facility]  Medicine         l



                                                Medicine                 Outpati



                                                                        ent



                                                                        Clinics

 

        2020 Outpatient                 [facility]

[id number] [facility]



        13:43:00 13:43:00                         [facility]  Medicine         l



                                                Medicine                 Outpati



                                                                        ent



                                                                        Clinics

 

        2020 Outpatient                 [facility]

[id number] [facility]



        16:38:00 16:38:00                         t [facility]  Medicine         l



                                                Medicine                 Outpati



                                                                        ent



                                                                        Clinics

 

        2020 Outpatient                 [facility]

[id number] [facility]



        09:17:00 09:17:00                         t [facility]  Medicine         l



                                                Medicine                 Outpati



                                                                        ent



                                                                        Clinics

 

        2020 Outpatient                 [facility]

[id number] [facility]



        08:00:00 08:00:00                         [facility]  Medicine         l



                                                Medicine                 Outpati



                                                                        ent



                                                                        Clinics

 

        2020 Outpatient                 [facility]

[id number] [facility]



        10:25:00 10:25:00                         t [facility]



                                                Medicine                 Outpati



                                                                        ent



                                                                        Clinics

 

        2020 Outpatient                 [facility]

[id number] [facility]



        08:27:00 08:27:00                         t [facility]



                                                Medicine                 Outpati



                                                                        ent



                                                                        Clinics

 

        2020 Outpatient                 [facility]

[id number] [facility]



        09:40:00 09:40:00                         t [facility]



                                                Medicine                 Outpati



                                                                        ent



                                                                        Clinics

 

        2020 Outpatient                 [facility]

[id number] [facility]



        15:20:00 15:20:00                         t [facility]



                                                Medicine                 Outpati



                                                                        ent



                                                                        Clinics

 

        2020 Outpatient                 [facility]

[id number] [facility]



        14:03:00 14:03:00                         [facility]



                                                Medicine                 Outpati



                                                                        ent



                                                                        Clinics

 

        2020 Outpatient                 [facility]

[id number] [facility]



        09:00:00 09:00:00                         [facility]



                                                Medicine                 Outpati



                                                                        ent



                                                                        Clinics







Results

This patient has no known results.

## 2021-01-18 NOTE — XMS REPORT
Summarization of Episode Note

                          Created on:2020



Patient:Aidee Henning

Sex:Female

:1975

External Reference #:[id number]





Demographics







                          Address                   [address] 



                                                    [place] [address]

 

                          Home Phone                [phone]

 

                          Mobile Phone              [phone]

 

                          Email Address             [email or web address]

 

                          Preferred Language        en

 

                          Marital Status            Unknown

 

                          Mu-ism Affiliation     Unknown

 

                          Race                      White

 

                          Ethnic Group              Not  or 









Author







                          Organization              [facility]

 

                          Address                   [address]



                                                    [place] [address]

 

                          Phone                     [phone]









Support







                Name            Relationship    Address         Phone

 

                Milady          Unavailable     [id number]     [phone]



                                                [place] [address] 

 

                Milady          Unavailable     [phone]    [phone]



                                                [place] [address] 









Care Team Providers







                    Name                Role                Phone

 

                    Biju Johnson         [phone]









PROBLEMS







        Type    Condition ICD9-CM FWR87-MC Onset   Condition SNOMED Code Notes



                        Code    Code    Dates   Status          

 

        Problem Acute           F32.9           Active  639537751 



                depression                                         

 

        Problem History of         Z98.82          Active          



                breast implant                                         

 

        Problem Migraine         G43.909         Active  [id number] 



                without status                                         



                migrainosus,                                         



                not                                             



                intractable,                                         



                unspecified                                         



                migraine type                                         

 

        Problem GERD without         K21.9           Active  [id number] 



                esophagitis                                         

 

        Problem Nausea alone         R11.0           Active  [id number] 







ALLERGIES

No Known Allergies



ENCOUNTERS from 1975 to 2020







             Encounter    Location     Date         Provider     Diagnosis

 

             [facility] [address] [facility] 10 Dec, 2020 Priya Pérezo

markoer for



             Family Medicine [address],                           observa

tion for



                          [place] [id number]                           suspected expo

sure to



                                                                 other biologica

l



                                                                 agents ruled ou

t



                                                                 Z03.818 ; Diarr

hea,



                                                                 unspecified typ

e



                                                                 R19.7 and



                                                                 Non-intractable



                                                                 vomiting with n

ausea,



                                                                 unspecified vom

iting



                                                                 type R11.2







IMMUNIZATIONS







                Vaccine         Route           Administration Date Status

 

                Flucelvax - single dose syringe IM Intramuscular Nov 10, 2020   

 Administered

 

                Kenalog (Triamcinolone) IM Intramuscular Nov 10, 2020    Adminis

tered

 

                Dexamethasone   IM Intramuscular Nov 10, 2020    Administered







SOCIAL HISTORY

Tobacco Use:





                    Social History Observation Description         Date

 

                    Details (start date - stop date) Former Smoker       



Sex Assigned At Birth:





                          Social History Observation Description

 

                          Sex Assigned At Birth     Unknown



PHQ9





                    Question            Answer              Notes

 

                    Little interest or pleasure in doing things More than half t

he days 

 

                    Feeling down, depressed, or hopeless More than half the days

 

 

                    Trouble falling or staying asleep or sleeping too More than 

half the days 



                    much                                    

 

                    Feeling tired or having little energy More than half the day

s 

 

                    Poor appetite or overeating Nearly every day    

 

                    Feeling bad about yourself, or that you are a Nearly every d

ay    



                    failure, or have let yourself or your family down           

          

 

                    Trouble concentrating on things, such as reading Several day

s        



                    the newspaper or watching television                     

 

                    Moving or speaking so slowly that other people Not at all   

       



                    could have noticed; or the opposite, being so               

      



                    fidgety or restless that you have been moving               

      



                    around a lot more than usual                     

 

                    Total Score         15                  

 

                    Interpretation      Moderately severe depression 

 

                    Thoughts that you would be better off dead or of Not at all 

         



                    hurting yourself in some way                     



Alcohol Screen





                    Question            Answer              Notes

 

                    Did you have a drink containing alcohol in the past Yes     

            



                    year?                                   

 

                    Points              1                   

 

                    Interpretation      Negative            

 

                    How often did you have a drink containing alcohol in Monthly

 or less (1 point) 



                    the past year?                          



Tobacco Use/Smoking





                    Question            Answer              Notes

 

                    Are you a           former smoker       







REASON FOR REFERRAL

No Information



VITAL SIGNS







                    Height              65 in               10 Dec, 2020

 

                    Weight              165 lbs             10 Dec, 2020

 

                    Temperature         98.6 degrees Fahrenheit 10 Dec, 2020

 

                    BMI                 27.45 kg/m2         10 Dec, 2020







MEDICATIONS







           Medication SIG (Take, Route, Frequency, Notes      Start Date End Angelito

e   Status



                      Duration)                                   

 

           Sertraline HCl 50 MG 1 tablet Orally Once a day                      

            Active



                      for 30 days                                  

 

           Promethazine HCl 25 mg 1 tablet as needed Orally                     

             Active



                      every 12 hrs for 15                                  

 

           Lansoprazole 30 MG take 1 capsule by mouth once                      

            Active



                      daily for 30 days Oral Once a                             

     



                      day for 90 days                                  







PROCEDURES

No Information



RESULTS

No Results



REASON FOR VISIT

diarrhea, nausea, Telehealth Visit, Telehealth Visit



MEDICAL (GENERAL) HISTORY







                    Type                Description         Date

 

                    Medical History     GERD without esophagitis 

 

                    Medical History     Migraine without status migrainosus, not

 intractable, 



                                        unspecified migraine type 

 

                    Medical History     Fibrocystic  Breast 

 

                    Medical History     Well woman exam with routine gynecologic

al exam 

 

                    Medical History     Acute vaginitis     

 

                    Surgical History    Breast  Implants  Saline  type. 1985







Goals Section

No Information



Health Concerns

No Information



MEDICAL EQUIPMENT

No Information



MENTAL STATUS

No Information



FUNCTIONAL STATUS

No Information



ASSESSMENTS







             Encounter Date Diagnosis    Assessment Notes Treatment Notes Treatm

ent



                                                                 Clinical Notes

 

             10 Dec, 2020 Encounter for              With patient's 



                          observation for              history of travel 



                          suspected                 and          



                          exposure to other              duration/severity of 



                          biological agents              symptoms, it was 



                          ruled out (ICD-10              determined to 



                          - Z03.818)                proceed with testing 



                                                    of this patient for 



                                                    suspected case of 



                                                    COVID-19. CDC/IDER 



                                                    protocols were 



                                                    followed. While 



                                                    maintaining minimal 



                                                    exposure specimen 



                                                    was collected while 



                                                    patient remained in 



                                                    their car. Provider 



                                                    went to the car 



                                                    dressed in   



                                                    appropriate PPE to 



                                                    collect the specimen 



                                                    while maintaining 



                                                    protocol.    



                                                    Appropriate  



                                                    departments were 



                                                    notified of PUI: 



                                                    Local health 



                                                    department, [facility] 



                                                    Quality Nurse. In 



                                                    addition, efforts 



                                                    were made to 



                                                    minimize the number 



                                                    of faculty and staff 



                                                    exposed to PUI by 



                                                    having one employee 



                                                    assess patient, take 



                                                    sample and  



                                                    patient, utilizing 



                                                    cell phone to 



                                                    communicate to limit 



                                                    amount of time 



                                                    entering and exiting 



                                                    isolation room, when 



                                                    necessary.   



                                                    ==================== 



                                                    === During   



                                                    testing/collection 



                                                    of nasopharyngeal 



                                                    specimen,    



                                                    staff/provider wore 



                                                    proper PPE, bagged 



                                                    specimen and 



                                                    properly disposed of 



                                                    PPE accordingly. - 



                                                    Specimen was 



                                                    verified with 



                                                    patient name/, 



                                                    properly handed to 



                                                    appropriate lab 



                                                    personnel. Patient 



                                                    was counseled based 



                                                    on clinical  



                                                    assessment, disease 



                                                    severity, and input 



                                                    from infectious 



                                                    disease and local 



                                                    health department. 



                                                    if local [facility] 



                                                    department deemed 



                                                    that patient can be 



                                                    sent home, patient 



                                                    was provided with a 



                                                    surgical mask and 



                                                    advised to wear 



                                                    while commuting 



                                                    home. Patient was 



                                                    advised to   



                                                    self-quarantine for 



                                                    14 days, or until 



                                                    negative results are 



                                                    received. Patient 



                                                    was instructed to 



                                                    refer to CDC 



                                                    guidelines and 



                                                    provided additional 



                                                    information on 



                                                    self-quarantine. 



                                                    Local health 



                                                    department contact 



                                                    information was 



                                                    given to patient: 



                                                    ==[facility], [address] [place] [id number].    



                                                    [phone] or 



                                                    [phone] ====== 



                                                    First day of 14-day 



                                                    quarantine: **** 

 

             10 Dec, 2020 Diarrhea,                 BRAT diet    



                          unspecified type                           



                          (ICD-10 - R19.7)                           

 

                10 Dec, 2020    Non-intractable                 clear liquids 



                                        



                          vomiting with              If unable to stop 



                          nausea,                   vomiting to ER 



                          unspecified                            



                          vomiting type                           



                          (ICD-10 - R11.2)                           

 

             10 Dec, 2020 Other                                  Total time spen

t



                                                                 by provider



                                                                 during this



                                                                 virtual visit w

as



                                                                 7 minutes. Also

,



                                                                 time was spent



                                                                 counseling and



                                                                 coordinating ca

re



                                                                 including but n

ot



                                                                 limited to



                                                                 discussion of



                                                                 test results,



                                                                 diagnostic or



                                                                 treatment



                                                                 recommendations

,



                                                                 prognosis, risk

s



                                                                 and benefits of



                                                                 management



                                                                 options,



                                                                 instructions,



                                                                 education,



                                                                 compliance and 

or



                                                                 risk reduction.



                                                                 Total time spen

t



                                                                 by provider



                                                                 during this



                                                                 virtual visit w

as



                                                                 ** minutes. Als

o,



                                                                 time was spent



                                                                 counseling and



                                                                 coordinating ca

re



                                                                 including but n

ot



                                                                 limited to



                                                                 discussion of



                                                                 test results,



                                                                 diagnostic or



                                                                 treatment



                                                                 recommendations

,



                                                                 prognosis, risk

s



                                                                 and benefits of



                                                                 management



                                                                 options,



                                                                 instructions,



                                                                 education,



                                                                 compliance and 

or



                                                                 risk reduction.







PLAN OF TREATMENT

Treatment Notes





                    Assessment          Notes               Clinical Notes

 

                    Encounter for observation for With patient's history of nyla

el and 



                    suspected exposure to other duration/severity of symptoms, i

t 



                    biological agents ruled out was determined to proceed with 



                                        testing of this patient for 



                                        suspected case of COVID-19. CDC/IDER 



                                        protocols were followed. While 



                                        maintaining minimal exposure 



                                        specimen was collected while patient 



                                        remained in their car. Provider went 



                                        to the car dressed in appropriate 



                                        PPE to collect the specimen while 



                                        maintaining protocol. Appropriate 



                                        departments were notified of PUI: 



                                        [facility] department, [facility] 



                                        Quality Nurse. In addition, efforts 



                                        were made to minimize the number of 



                                        faculty and staff exposed to PUI by 



                                        having one employee assess patient, 



                                        take sample and  patient, 



                                        utilizing cell phone to communicate 



                                        to limit amount of time entering and 



                                        exiting isolation room, when 



                                        necessary. ======================= 



                                        During testing/collection of 



                                        nasopharyngeal specimen, 



                                        staff/provider wore proper PPE, 



                                        bagged specimen and properly 



                                        disposed of PPE accordingly. - 



                                        Specimen was verified with patient 



                                        name/, properly handed to 



                                        appropriate lab personnel. Patient 



                                        was counseled based on clinical 



                                        assessment, disease severity, and 



                                        input from infectious disease and 



                                        local health department. if local 



                                        health department deemed that 



                                        patient can be sent home, patient 



                                        was provided with a surgical mask 



                                        and advised to wear while commuting 



                                        home. Patient was advised to 



                                        self-quarantine for 14 days, or 



                                        until negative results are received. 



                                        Patient was instructed to refer to 



                                        CDC guidelines and provided 



                                        additional information on 



                                        self-quarantine. Local [facility] 



                                        department contact information was 



                                        given to patient: ==[facility], [address] [place] [id number]. [phone] or 



                                        [phone] ====== First day of 



                                        14-day quarantine: **** 

 

                    Diarrhea, unspecified type BRAT diet           

 

                    Non-intractable vomiting with clear liquidsIf unable to stop

 



                    nausea, unspecified vomiting type vomiting to ER      



Treatment Notes





                          Test Name                 Order Date

 

                          Novel Coronavirus (COVID-19), ROGERIO 2020

 

                          Inpatient                 2020



Next Appt





                                        Details

 

                                        pending test results Reason:







Insurance Providers







          Payer Name Payer     Payer     Insured   Patient   Coverage  Coverage 

End



                    Address   Phone     Name      Relationship to Start Date Angelito

e



                                                  Insured             

 

          [employer] from [address]    [phone] Aidee Henning self      2020 



          [place]  [id number]    [id number]                             



          [facility]                                         



                    [id number]

## 2022-09-24 NOTE — ER
Nurse's Notes                                                                                     

 [facility]                                                                

Name: Aidee Henning                                                                                 

Age: 43 yrs                                                                                       

Sex: Female                                                                                       

: 1975                                                                                   

MRN: [id number]                                                                                   

Arrival Date: 2019                                                                          

Time: 08:20                                                                                       

Account#: [id number]                                                                            

Bed 16                                                                                            

Private MD: Jinny Marshall                                                                    

Diagnosis: Other peripheral vertigo, left ear;Influenza-like illness                              

                                                                                                  

Presentation:                                                                                     

                                                                                             

08:44 Presenting complaint: Patient states: woke up this morning feeling dizzy, worse when    iw  

      moving head back and forth and side to side, has been dealing with a cold, denies           

      headache, denies fever. Transition of care: patient was not received from another           

      setting of care. Onset of symptoms was 2019. Risk Assessment: Do you want      

      to hurt yourself or someone else? Patient reports no desire to harm self or others.         

      Initial Sepsis Screen: Does the patient meet any 2 criteria? No. Patient's initial          

      sepsis screen is negative. Does the patient have a suspected source of infection? No.       

      Patient's initial sepsis screen is negative. Care prior to arrival: None.                   

08:44 Method Of Arrival: Ambulatory                                                           iw  

08:44 Acuity: BALTAZAR 3                                                                           iw  

                                                                                                  

OB/GYN:                                                                                           

08:45 LMP 2019                                                                            iw  

                                                                                                  

Historical:                                                                                       

- Allergies:                                                                                      

08:47 No Known Allergies;                                                                     iw  

- Home Meds:                                                                                      

08:47 None [Active];                                                                          iw  

- PMHx:                                                                                           

08:47 PUD;                                                                                    iw  

- PSHx:                                                                                           

08:47 Tubal ligation;                                                                         iw  

                                                                                                  

- Immunization history:: Adult Immunizations not up to date.                                      

- Social history:: Smoking status: Patient/guardian denies using tobacco.                         

- Ebola Screening: : Patient negative for fever greater than or equal to 101.5 degrees            

  Fahrenheit, and additional compatible Ebola Virus Disease symptoms Patient denies               

  exposure to infectious person Patient denies travel to an Ebola-affected area in the            

  21 days before illness onset No symptoms or risks identified at this time.                      

                                                                                                  

                                                                                                  

Screenin:12 Abuse screen: Denies threats or abuse. Denies injuries from another. Nutritional        bp  

      screening: No deficits noted. Tuberculosis screening: No symptoms or risk factors           

      identified. Fall Risk None identified.                                                      

                                                                                                  

Assessment:                                                                                       

09:00 General: Appears in no apparent distress. comfortable, Behavior is cooperative,         bp  

      appropriate for age, anxious. Pain: Denies pain. Neuro: Level of Consciousness is           

      awake, alert, obeys commands, Oriented to person, place, time, situation, Appropriate       

      for age Moves all extremities. Full function Gait is steady, Reports dizziness.             

      Cardiovascular: No deficits noted. Respiratory: Airway is patent Respiratory effort is      

      even, unlabored, Respiratory pattern is regular, symmetrical. GI: No signs and/or           

      symptoms were reported involving the gastrointestinal system. : No signs and/or           

      symptoms were reported regarding the genitourinary system. EENT: No deficits noted.         

      Derm: No deficits noted. Musculoskeletal: Circulation, motion, and sensation intact.        

      Range of motion: intact in all extremities.                                                 

09:37 Reassessment: PT D/C HOME AMBULATORY, DX WITH VERTIGO.                                  bp  

                                                                                                  

Vital Signs:                                                                                      

08:45  / 73; Pulse 90; Resp 16; Temp 97.9(TE); Pulse Ox 98% on R/A; Weight 70.31 kg;    iw  

      Height 5 ft. 6 in. (167.64 cm); Pain 0/10;                                                  

09:37  / 96; Pulse 80; Resp 14; Pulse Ox 100% ;                                         bp  

08:45 Body Mass Index 25.02 (70.31 kg, 167.64 cm)                                             iw  

                                                                                                  

ED Course:                                                                                        

08:20 Patient arrived in ED.                                                                  mr  

08:20 Jinny Marshall is Private Physician.                                                mr  

08:36 Danny Montez MD is Attending Physician.                                             ps1 

08:41 Martin Parsons, RN is Primary Nurse.                                                    bp  

08:45 Triage completed.                                                                       iw  

08:45 Arm band placed on.                                                                     iw  

09:12 Patient has correct armband on for positive identification. Bed in low position. Call   bp  

      light in reach. Side rails up X2.                                                           

09:29 Jinny Marshall is Referral Physician.                                               ps1 

09:38 No provider procedures requiring assistance completed. Patient did not have IV access   bp  

      during this emergency room visit.                                                           

                                                                                                  

Administered Medications:                                                                         

09:10 Drug: Meclizine 25 mg Route: PO;                                                        bp  

09:39 Follow up: Response: No adverse reaction; Marked relief of symptoms                     bp  

09:10 Drug: Decadron - Dexamethasone 10 mg {Note: GIVEN PO.} Route: IVP; Site: Other;         bp  

09:38 Follow up: Response: No adverse reaction; Marked relief of symptoms                     bp  

                                                                                                  

                                                                                                  

Outcome:                                                                                          

09:29 Discharge ordered by MD.                                                                ps1 

09:38 Discharged to home ambulatory, with family.                                             bp  

09:38 Condition: stable                                                                           

09:38 Discharge instructions given to patient, Instructed on discharge instructions, follow       

      up and referral plans. medication usage, Demonstrated understanding of instructions,        

      follow-up care, medications, Prescriptions given X 3.                                       

09:39 Patient left the ED.                                                                    bp  

                                                                                                  

Signatures:                                                                                       

Lara Garcias Irene, RN                     RN   iw                                                   

Martin Parsons RN                      RN   bp                                                   

Danny Montez MD MD   ps1                                                  

                                                                                                  

**************************************************************************************************
General